# Patient Record
Sex: FEMALE | Race: WHITE | NOT HISPANIC OR LATINO | Employment: OTHER | URBAN - NONMETROPOLITAN AREA
[De-identification: names, ages, dates, MRNs, and addresses within clinical notes are randomized per-mention and may not be internally consistent; named-entity substitution may affect disease eponyms.]

---

## 2017-06-10 ENCOUNTER — OFFICE VISIT (OUTPATIENT)
Dept: RETAIL CLINIC | Facility: CLINIC | Age: 62
End: 2017-06-10

## 2017-06-10 VITALS
SYSTOLIC BLOOD PRESSURE: 149 MMHG | HEART RATE: 84 BPM | OXYGEN SATURATION: 98 % | DIASTOLIC BLOOD PRESSURE: 86 MMHG | TEMPERATURE: 98.7 F

## 2017-06-10 DIAGNOSIS — Z20.818 EXPOSURE TO STREP THROAT: ICD-10-CM

## 2017-06-10 DIAGNOSIS — J40 BRONCHITIS: Primary | ICD-10-CM

## 2017-06-10 LAB
EXPIRATION DATE: NORMAL
INTERNAL CONTROL: NORMAL
Lab: NORMAL
S PYO AG THROAT QL: NEGATIVE

## 2017-06-10 PROCEDURE — 99213 OFFICE O/P EST LOW 20 MIN: CPT | Performed by: NURSE PRACTITIONER

## 2017-06-10 PROCEDURE — 87880 STREP A ASSAY W/OPTIC: CPT | Performed by: NURSE PRACTITIONER

## 2017-06-10 RX ORDER — ESOMEPRAZOLE MAGNESIUM 40 MG/1
CAPSULE, DELAYED RELEASE ORAL
COMMUNITY
Start: 2013-11-22 | End: 2020-06-16 | Stop reason: SDUPTHER

## 2017-06-10 RX ORDER — GUAIFENESIN 600 MG/1
1200 TABLET, EXTENDED RELEASE ORAL 2 TIMES DAILY
Qty: 40 TABLET | Refills: 0 | Status: SHIPPED | OUTPATIENT
Start: 2017-06-10 | End: 2017-06-20

## 2017-06-10 RX ORDER — LORATADINE 10 MG/1
TABLET ORAL
COMMUNITY

## 2017-06-10 RX ORDER — AZITHROMYCIN 250 MG/1
TABLET, FILM COATED ORAL
Qty: 6 TABLET | Refills: 0 | Status: SHIPPED | OUTPATIENT
Start: 2017-06-10 | End: 2018-12-29

## 2017-06-10 RX ORDER — FLUTICASONE PROPIONATE 50 MCG
SPRAY, SUSPENSION (ML) NASAL
COMMUNITY
End: 2020-05-19 | Stop reason: SDUPTHER

## 2017-06-10 NOTE — PATIENT INSTRUCTIONS
Increase fluid intake  Warm salt water gargles as needed for sore throat  Do not over suppress cough  If no improvement over next 2-3 days or symptoms worsen, follow up with PCP      Acute Bronchitis  Bronchitis is inflammation of the airways that extend from the windpipe into the lungs (bronchi). The inflammation often causes mucus to develop. This leads to a cough, which is the most common symptom of bronchitis.   In acute bronchitis, the condition usually develops suddenly and goes away over time, usually in a couple weeks. Smoking, allergies, and asthma can make bronchitis worse. Repeated episodes of bronchitis may cause further lung problems.   CAUSES  Acute bronchitis is most often caused by the same virus that causes a cold. The virus can spread from person to person (contagious) through coughing, sneezing, and touching contaminated objects.  SIGNS AND SYMPTOMS   · Cough.    · Fever.    · Coughing up mucus.    · Body aches.    · Chest congestion.    · Chills.    · Shortness of breath.    · Sore throat.    DIAGNOSIS   Acute bronchitis is usually diagnosed through a physical exam. Your health care provider will also ask you questions about your medical history. Tests, such as chest X-rays, are sometimes done to rule out other conditions.   TREATMENT   Acute bronchitis usually goes away in a couple weeks. Oftentimes, no medical treatment is necessary. Medicines are sometimes given for relief of fever or cough. Antibiotic medicines are usually not needed but may be prescribed in certain situations. In some cases, an inhaler may be recommended to help reduce shortness of breath and control the cough. A cool mist vaporizer may also be used to help thin bronchial secretions and make it easier to clear the chest.   HOME CARE INSTRUCTIONS  · Get plenty of rest.    · Drink enough fluids to keep your urine clear or pale yellow (unless you have a medical condition that requires fluid restriction). Increasing fluids may  help thin your respiratory secretions (sputum) and reduce chest congestion, and it will prevent dehydration.    · Take medicines only as directed by your health care provider.  · If you were prescribed an antibiotic medicine, finish it all even if you start to feel better.  · Avoid smoking and secondhand smoke. Exposure to cigarette smoke or irritating chemicals will make bronchitis worse. If you are a smoker, consider using nicotine gum or skin patches to help control withdrawal symptoms. Quitting smoking will help your lungs heal faster.    · Reduce the chances of another bout of acute bronchitis by washing your hands frequently, avoiding people with cold symptoms, and trying not to touch your hands to your mouth, nose, or eyes.    · Keep all follow-up visits as directed by your health care provider.    SEEK MEDICAL CARE IF:  Your symptoms do not improve after 1 week of treatment.   SEEK IMMEDIATE MEDICAL CARE IF:  · You develop an increased fever or chills.    · You have chest pain.    · You have severe shortness of breath.  · You have bloody sputum.    · You develop dehydration.  · You faint or repeatedly feel like you are going to pass out.  · You develop repeated vomiting.  · You develop a severe headache.  MAKE SURE YOU:   · Understand these instructions.  · Will watch your condition.  · Will get help right away if you are not doing well or get worse.     This information is not intended to replace advice given to you by your health care provider. Make sure you discuss any questions you have with your health care provider.     Document Released: 01/25/2006 Document Revised: 01/08/2016 Document Reviewed: 06/10/2014  [employer] Patient Education ©2017 [employer]

## 2017-06-10 NOTE — PROGRESS NOTES
Serjio Diaz is a 62 y.o. female.     Cough   This is a new problem. The current episode started in the past 7 days (Worsened overnight). The problem has been gradually worsening. The cough is non-productive. Associated symptoms include chest pain (With cough), nasal congestion, a sore throat (Exposed to strep) and sweats (Hot flashes at night; normal for patient). Pertinent negatives include no fever. The symptoms are aggravated by lying down. Treatments tried: Cold compresses; Flonase  The treatment provided moderate (Flonase clears out congestion; uses daily) relief. Her past medical history is significant for environmental allergies. There is no history of asthma, COPD or pneumonia.        The following portions of the patient's history were reviewed and updated as appropriate: allergies, current medications, past family history, past medical history, past social history, past surgical history and problem list.    Review of Systems   Constitutional: Negative for fever.   HENT: Positive for congestion, sinus pressure and sore throat (Exposed to strep).    Eyes: Negative for discharge.   Respiratory: Positive for cough.    Cardiovascular: Positive for chest pain (With cough).   Gastrointestinal: Negative for diarrhea, nausea and vomiting.   Allergic/Immunologic: Positive for environmental allergies.       Objective   Physical Exam   Constitutional: She appears well-developed. No distress.   HENT:   Right Ear: External ear normal. Tympanic membrane is not erythematous and not bulging.   Left Ear: External ear normal. Tympanic membrane is erythematous. Tympanic membrane is not bulging.   Nose: Right sinus exhibits no maxillary sinus tenderness and no frontal sinus tenderness. Left sinus exhibits no maxillary sinus tenderness and no frontal sinus tenderness.   Mouth/Throat: Posterior oropharyngeal erythema (No tonsillar enlargement visible) present. No oropharyngeal exudate.   Neck: Neck supple.    Cardiovascular: Normal rate, regular rhythm and normal heart sounds.  Exam reveals no gallop and no friction rub.    No murmur heard.  Pulmonary/Chest: Effort normal and breath sounds normal. No respiratory distress. She has no wheezes. She has no rales.   Lymphadenopathy:     She has cervical adenopathy (Tonsillar nodes).   Neurological: She is alert.   Skin: Skin is warm. She is not diaphoretic.   Psychiatric: She has a normal mood and affect. Her behavior is normal.       Assessment/Plan   Julia was seen today for cough.    Diagnoses and all orders for this visit:    Bronchitis    Exposure to strep throat    Advised to refrain from decongestants based on elevated BP today.  Patient will monitor at home.  States she gets nervous in office.  Tight, coarse cough noted throughout visit.        Increase fluid intake  Warm salt water gargles as needed for sore throat  Do not over suppress cough  If no improvement over next 2-3 days or symptoms worsen, follow up with PCP

## 2017-11-30 RX ORDER — SIMVASTATIN 20 MG
20 TABLET ORAL NIGHTLY
COMMUNITY

## 2017-11-30 RX ORDER — NAPROXEN SODIUM 220 MG
220 TABLET ORAL 2 TIMES DAILY WITH MEALS
COMMUNITY

## 2017-12-26 ENCOUNTER — TELEPHONE (OUTPATIENT)
Dept: GASTROENTEROLOGY | Age: 62
End: 2017-12-26

## 2018-01-24 ENCOUNTER — ANESTHESIA (OUTPATIENT)
Dept: OPERATING ROOM | Age: 63
End: 2018-01-24

## 2018-01-24 ENCOUNTER — HOSPITAL ENCOUNTER (OUTPATIENT)
Age: 63
Setting detail: SPECIMEN
Discharge: HOME | End: 2018-01-24
Payer: COMMERCIAL

## 2018-01-24 ENCOUNTER — ANESTHESIA EVENT (OUTPATIENT)
Dept: OPERATING ROOM | Age: 63
End: 2018-01-24

## 2018-01-24 ENCOUNTER — HOSPITAL ENCOUNTER (OUTPATIENT)
Age: 63
Setting detail: OUTPATIENT SURGERY
Discharge: HOME | End: 2018-01-24
Attending: INTERNAL MEDICINE | Admitting: INTERNAL MEDICINE
Payer: COMMERCIAL

## 2018-01-24 VITALS — DIASTOLIC BLOOD PRESSURE: 59 MMHG | SYSTOLIC BLOOD PRESSURE: 112 MMHG | OXYGEN SATURATION: 100 %

## 2018-01-24 VITALS
WEIGHT: 180 LBS | OXYGEN SATURATION: 93 % | DIASTOLIC BLOOD PRESSURE: 51 MMHG | HEART RATE: 82 BPM | SYSTOLIC BLOOD PRESSURE: 100 MMHG | BODY MASS INDEX: 31.89 KG/M2 | TEMPERATURE: 98.7 F | HEIGHT: 63 IN | RESPIRATION RATE: 18 BRPM

## 2018-01-24 PROCEDURE — 45380 COLONOSCOPY AND BIOPSY: CPT | Performed by: INTERNAL MEDICINE

## 2018-01-24 PROCEDURE — G8918 PT W/O PREOP ORDER IV AB PRO: HCPCS

## 2018-01-24 PROCEDURE — 45385 COLONOSCOPY W/LESION REMOVAL: CPT | Performed by: INTERNAL MEDICINE

## 2018-01-24 PROCEDURE — 88305 TISSUE EXAM BY PATHOLOGIST: CPT

## 2018-01-24 PROCEDURE — 45380 COLONOSCOPY AND BIOPSY: CPT

## 2018-01-24 PROCEDURE — 45385 COLONOSCOPY W/LESION REMOVAL: CPT

## 2018-01-24 PROCEDURE — G8907 PT DOC NO EVENTS ON DISCHARG: HCPCS

## 2018-01-24 RX ORDER — PROPOFOL 10 MG/ML
INJECTION, EMULSION INTRAVENOUS PRN
Status: DISCONTINUED | OUTPATIENT
Start: 2018-01-24 | End: 2018-01-24 | Stop reason: SDUPTHER

## 2018-01-24 RX ORDER — SODIUM CHLORIDE, SODIUM LACTATE, POTASSIUM CHLORIDE, CALCIUM CHLORIDE 600; 310; 30; 20 MG/100ML; MG/100ML; MG/100ML; MG/100ML
INJECTION, SOLUTION INTRAVENOUS CONTINUOUS
Status: DISCONTINUED | OUTPATIENT
Start: 2018-01-24 | End: 2018-01-24 | Stop reason: HOSPADM

## 2018-01-24 RX ADMIN — SODIUM CHLORIDE, SODIUM LACTATE, POTASSIUM CHLORIDE, CALCIUM CHLORIDE: 600; 310; 30; 20 INJECTION, SOLUTION INTRAVENOUS at 10:16

## 2018-01-24 RX ADMIN — PROPOFOL 350 MG: 10 INJECTION, EMULSION INTRAVENOUS at 10:41

## 2018-01-24 NOTE — OP NOTE
Post Procedure Note    Name of surgeon / : Srinivas Gupta DO    Date of Service: 01/24/18    Withdrawal Time: >6min    Prep Quality: excellent     Pre-operative Diagnosis:   Active Hospital Problems    Diagnosis Date Noted    Colon cancer screening [Z12.11] 10/01/2013       Post-operative Diagnosis/Findings: colon polyps    Procedure: Procedure(s):  COLONOSCOPY WITH cold biopsy polypectomy and cold snare polypectomy    Anesthesia: Monitor Anesthesia Care    Surgeons/Assistants: Srinivas Gupta DO    Referring Physician: Kim Daley, *    Procedure Note:  After informed consent was obtained, the patient was placed in the left lateral decubitus position and sedated per MAC. A rectal exam was done which was normal.  The colonoscope was inserted into the rectum and retroflexed which was normal.  The colonoscope was then advanced to the cecum under direct visualization. The appendiceal orifice and ileocecal valve were identified. The colonoscope was withdrawn. A polyp was seen in the ascending colon. It was small in size. It was removed via removed by cold snare and retrieved for pathology. 2 polyps were seen in the transverse colon. One was removed via cold snare and the other via cold biopsy polypectomy. 3 dimunitive polyps were seen in the rectum. They were removed via cold biopsy polypectomy. No other abnormalities were discovered. The colonoscope was completely withdrawn. The patient tolerated the procedure.      Estimated Blood Loss: Minimal    Complications: None    Specimens:   ID Type Source Tests Collected by Time Destination   A : ascending colon polyp Tissue Colon SURGICAL PATHOLOGY Joslyn Webster,  1/24/2018 1054    B : transverse colon polyp Tissue Colon SURGICAL PATHOLOGY Joslyn Webster,  1/24/2018 1057    C : transverse colon polyp cold snare Tissue Colon SURGICAL PATHOLOGY Joslyn Webster,  1/24/2018 1059    D : rectal sig polyp  Tissue Recto sigmoid SURGICAL PATHOLOGY Timoteo Webster, DO 1/24/2018 1102        Discussion: The patient had Colon Polyps. I will f/u on Pathology and likely recommend a repeat colonoscopy in 3 years.     [address] [facility], DO  01/24/18  11:07 AM

## 2018-01-24 NOTE — ANESTHESIA PRE PROCEDURE
Department of Anesthesiology  Preprocedure Note       Name:  Lilia Moore   Age:  57 y.o.  :  1955                                          MRN:  [id number]         Date:  2018      Surgeon: Millicent Nolen):  Chelsie Webster DO    Procedure: Procedure(s):  COLONOSCOPY DIAGNOSTIC OR SCREENING    Medications prior to admission:   Prior to Admission medications    Medication Sig Start Date End Date Taking? Authorizing Provider   simvastatin (ZOCOR) 20 MG tablet Take 20 mg by mouth nightly   Yes Historical Provider, MD   naproxen sodium (ALEVE) 220 MG tablet Take 220 mg by mouth 2 times daily (with meals)   Yes Historical Provider, MD   fluticasone (FLONASE) 50 MCG/ACT nasal spray 1 spray by Nasal route daily   Yes Historical Provider, MD   esomeprazole (NEXIUM) 40 MG capsule Take 1 capsule by mouth daily. 13  Yes Guy Webster DO   loratadine (CLARITIN) 10 MG tablet Take 10 mg by mouth daily. Yes Historical Provider, MD   aspirin 325 MG EC tablet Take 1 tablet by mouth 2 times daily 7/10/16   Zo Huertas MD       Current medications:    No current facility-administered medications for this encounter.         Allergies:  No Known Allergies    Problem List:    Patient Active Problem List   Diagnosis Code    Colon cancer screening Z12.11    Reflux K21.9       Past Medical History:        Diagnosis Date    Allergic rhinitis     Anxiety     Arthritis     Backache, unspecified     GERD (gastroesophageal reflux disease)     History of blood transfusion     Pure hypercholesterolemia     Sciatic pain     into rt leg    Skin lesion     Unspecified arthropathy, multiple sites     Vitamin D deficiency        Past Surgical History:        Procedure Laterality Date    CARPAL TUNNEL RELEASE Left      SECTION      x 2    EXTERNAL EAR SURGERY      HYSTERECTOMY      still has bilateral ovaries    PILONIDAL CYST EXCISION      TOTAL KNEE ARTHROPLASTY Left 2016    KNEE TOTAL

## 2018-12-29 ENCOUNTER — OFFICE VISIT (OUTPATIENT)
Dept: RETAIL CLINIC | Facility: CLINIC | Age: 63
End: 2018-12-29

## 2018-12-29 VITALS
HEART RATE: 90 BPM | DIASTOLIC BLOOD PRESSURE: 86 MMHG | TEMPERATURE: 97.2 F | SYSTOLIC BLOOD PRESSURE: 142 MMHG | OXYGEN SATURATION: 97 % | RESPIRATION RATE: 20 BRPM

## 2018-12-29 DIAGNOSIS — J20.9 ACUTE BRONCHITIS, UNSPECIFIED ORGANISM: Primary | ICD-10-CM

## 2018-12-29 PROCEDURE — 99213 OFFICE O/P EST LOW 20 MIN: CPT | Performed by: NURSE PRACTITIONER

## 2018-12-29 PROCEDURE — 94640 AIRWAY INHALATION TREATMENT: CPT | Performed by: NURSE PRACTITIONER

## 2018-12-29 RX ORDER — ALBUTEROL SULFATE 2.5 MG/3ML
2.5 SOLUTION RESPIRATORY (INHALATION) EVERY 6 HOURS PRN
Status: DISCONTINUED | OUTPATIENT
Start: 2018-12-29 | End: 2018-12-29

## 2018-12-29 RX ORDER — ALBUTEROL SULFATE 2.5 MG/3ML
2.5 SOLUTION RESPIRATORY (INHALATION) ONCE
Status: COMPLETED | OUTPATIENT
Start: 2018-12-29 | End: 2018-12-29

## 2018-12-29 RX ORDER — METHYLPREDNISOLONE 4 MG/1
TABLET ORAL
Qty: 21 TABLET | Refills: 0 | Status: SHIPPED | OUTPATIENT
Start: 2018-12-29 | End: 2020-04-15

## 2018-12-29 RX ORDER — AMOXICILLIN AND CLAVULANATE POTASSIUM 875; 125 MG/1; MG/1
1 TABLET, FILM COATED ORAL 2 TIMES DAILY
Qty: 20 TABLET | Refills: 0 | Status: SHIPPED | OUTPATIENT
Start: 2018-12-29 | End: 2019-01-08

## 2018-12-29 RX ORDER — ATORVASTATIN CALCIUM 10 MG/1
10 TABLET, FILM COATED ORAL DAILY
Refills: 3 | COMMUNITY
Start: 2018-12-13 | End: 2020-07-16 | Stop reason: SINTOL

## 2018-12-29 RX ORDER — ALBUTEROL SULFATE 90 UG/1
2 AEROSOL, METERED RESPIRATORY (INHALATION) EVERY 4 HOURS PRN
Qty: 1 INHALER | Refills: 0 | Status: SHIPPED | OUTPATIENT
Start: 2018-12-29 | End: 2021-07-20

## 2018-12-29 RX ADMIN — ALBUTEROL SULFATE 2.5 MG: 2.5 SOLUTION RESPIRATORY (INHALATION) at 15:21

## 2018-12-29 NOTE — PROGRESS NOTES
Chief Complaint   Patient presents with   • Cough     Subjective   Julia Diaz is a 63 y.o. female who presents to the clinic today with complaints cough. She has had cold symptoms for about 2 weeks and now she is coughing and sinus pressure has gotten worse. She says HA and coughing have worn her out. She is caring for her  who had a stroke.   Cough   This is a new problem. The current episode started 1 to 4 weeks ago. The problem has been gradually worsening. The cough is productive of purulent sputum. Associated symptoms include ear congestion, a fever, headaches, postnasal drip, shortness of breath and wheezing. Pertinent negatives include no chest pain, chills, ear pain, heartburn, hemoptysis, myalgias, nasal congestion, rash, rhinorrhea, sore throat, sweats or weight loss. Nothing aggravates the symptoms. Risk factors for lung disease include smoking/tobacco exposure. She has tried nothing for the symptoms. Her past medical history is significant for environmental allergies. There is no history of asthma, bronchiectasis, bronchitis, COPD, emphysema or pneumonia.         Current Outpatient Medications:   •  esomeprazole (nexIUM) 40 MG capsule, Take  by mouth., Disp: , Rfl:   •  fluticasone (FLONASE) 50 MCG/ACT nasal spray, into each nostril., Disp: , Rfl:   •  loratadine (CLARITIN) 10 MG tablet, Take  by mouth., Disp: , Rfl:   •  Naproxen Sodium (ALEVE PO), Take  by mouth., Disp: , Rfl:   •  albuterol sulfate  (90 Base) MCG/ACT inhaler, Inhale 2 puffs Every 4 (Four) Hours As Needed for Wheezing or Shortness of Air., Disp: 1 inhaler, Rfl: 0  •  amoxicillin-clavulanate (AUGMENTIN) 875-125 MG per tablet, Take 1 tablet by mouth 2 (Two) Times a Day for 10 days., Disp: 20 tablet, Rfl: 0  •  atorvastatin (LIPITOR) 10 MG tablet, Take 10 mg by mouth Daily., Disp: , Rfl: 3  •  MethylPREDNISolone (MEDROL, ALICIA,) 4 MG tablet, Take as directed on package instructions., Disp: 21 tablet, Rfl: 0  No current  facility-administered medications for this visit.     Allergies:  Patient has no known allergies.    Past Medical History:   Diagnosis Date   • Acid reflux    • Allergic    • Arthritis    • Elevated cholesterol    • Sinusitis      Past Surgical History:   Procedure Laterality Date   • CARPAL TUNNEL RELEASE     •  SECTION     • HYSTERECTOMY     • INNER EAR SURGERY     • REPLACEMENT TOTAL KNEE       Family History   Problem Relation Age of Onset   • Obesity Mother    • Cancer Father    • Heart disease Father    • Obesity Father      Social History     Tobacco Use   • Smoking status: Current Every Day Smoker   • Smokeless tobacco: Never Used   Substance Use Topics   • Alcohol use: No   • Drug use: No       Review of Systems  Review of Systems   Constitutional: Positive for fatigue and fever. Negative for chills and weight loss.   HENT: Positive for postnasal drip, sinus pressure and sinus pain. Negative for congestion, ear pain, rhinorrhea, sneezing and sore throat.    Respiratory: Positive for cough, shortness of breath and wheezing. Negative for hemoptysis.    Cardiovascular: Negative for chest pain.   Gastrointestinal: Negative for heartburn.   Musculoskeletal: Negative for myalgias.   Skin: Negative for rash.   Allergic/Immunologic: Positive for environmental allergies.   Neurological: Positive for headaches.   Hematological: Negative for adenopathy.       Objective   /86 (BP Location: Left arm, Patient Position: Sitting, Cuff Size: Adult)   Pulse 90   Temp 97.2 °F (36.2 °C) (Tympanic)   Resp 20   SpO2 97%       Physical Exam   Constitutional: She is oriented to person, place, and time. She appears well-developed and well-nourished.   HENT:   Head: Normocephalic and atraumatic.   Right Ear: Hearing, external ear and ear canal normal. Tympanic membrane is erythematous. Tympanic membrane is not bulging. No middle ear effusion.   Left Ear: Hearing, external ear and ear canal normal. Tympanic membrane  is erythematous. Tympanic membrane is not bulging.  No middle ear effusion.   Nose: Mucosal edema present. Right sinus exhibits maxillary sinus tenderness and frontal sinus tenderness. Left sinus exhibits maxillary sinus tenderness and frontal sinus tenderness.   Mouth/Throat: Uvula is midline and mucous membranes are normal. Oropharyngeal exudate and posterior oropharyngeal erythema present. No posterior oropharyngeal edema or tonsillar abscesses. Tonsils are 1+ on the right. Tonsils are 1+ on the left. No tonsillar exudate.   Eyes: EOM are normal. Pupils are equal, round, and reactive to light.   Neck: Normal range of motion. Neck supple.   Cardiovascular: Normal rate, regular rhythm and normal heart sounds. Exam reveals no gallop and no friction rub.   No murmur heard.  Pulmonary/Chest: Effort normal. No stridor. No respiratory distress. She has wheezes. She has no rales. She exhibits no tenderness.   Diminished breath sounds. Air movement and wheezing improved with albuterol nebulizer.    Lymphadenopathy:     She has no cervical adenopathy.   Neurological: She is alert and oriented to person, place, and time.   Psychiatric: She has a normal mood and affect.   Vitals reviewed.      Assessment/Plan     Julia was seen today for cough.    Diagnoses and all orders for this visit:    Acute bronchitis, unspecified organism  -     albuterol (PROVENTIL) nebulizer solution 0.083% 2.5 mg/3mL; Take 2.5 mg by nebulization 1 (One) Time.    Other orders  -     amoxicillin-clavulanate (AUGMENTIN) 875-125 MG per tablet; Take 1 tablet by mouth 2 (Two) Times a Day for 10 days.  -     MethylPREDNISolone (MEDROL, ALICIA,) 4 MG tablet; Take as directed on package instructions.  -     albuterol sulfate  (90 Base) MCG/ACT inhaler; Inhale 2 puffs Every 4 (Four) Hours As Needed for Wheezing or Shortness of Air.        Drink plenty of fluids and rest  Saline nasal spray  Follow up if symptoms worsen or persist may need chest  x-ray  Go to emergency room if fever, shortness of breath or wheezing worsens.

## 2018-12-29 NOTE — PATIENT INSTRUCTIONS
Drink plenty of fluids and rest  Saline nasal spray  Follow up if symptoms worsen or persist may need chest x-ray  Go to emergency room if fever, shortness of breath or wheezing worsens.   Sinusitis, Adult  Sinusitis is soreness and inflammation of your sinuses. Sinuses are hollow spaces in the bones around your face. Your sinuses are located:  · Around your eyes.  · In the middle of your forehead.  · Behind your nose.  · In your cheekbones.    Your sinuses and nasal passages are lined with a stringy fluid (mucus). Mucus normally drains out of your sinuses. When your nasal tissues become inflamed or swollen, the mucus can become trapped or blocked so air cannot flow through your sinuses. This allows bacteria, viruses, and funguses to grow, which leads to infection.  Sinusitis can develop quickly and last for 7?10 days (acute) or for more than 12 weeks (chronic). Sinusitis often develops after a cold.  What are the causes?  This condition is caused by anything that creates swelling in the sinuses or stops mucus from draining, including:  · Allergies.  · Asthma.  · Bacterial or viral infection.  · Abnormally shaped bones between the nasal passages.  · Nasal growths that contain mucus (nasal polyps).  · Narrow sinus openings.  · Pollutants, such as chemicals or irritants in the air.  · A foreign object stuck in the nose.  · A fungal infection. This is rare.    What increases the risk?  The following factors may make you more likely to develop this condition:  · Having allergies or asthma.  · Having had a recent cold or respiratory tract infection.  · Having structural deformities or blockages in your nose or sinuses.  · Having a weak immune system.  · Doing a lot of swimming or diving.  · Overusing nasal sprays.  · Smoking.    What are the signs or symptoms?  The main symptoms of this condition are pain and a feeling of pressure around the affected sinuses. Other symptoms include:  · Upper  toothache.  · Earache.  · Headache.  · Bad breath.  · Decreased sense of smell and taste.  · A cough that may get worse at night.  · Fatigue.  · Fever.  · Thick drainage from your nose. The drainage is often green and it may contain pus (purulent).  · Stuffy nose or congestion.  · Postnasal drip. This is when extra mucus collects in the throat or back of the nose.  · Swelling and warmth over the affected sinuses.  · Sore throat.  · Sensitivity to light.    How is this diagnosed?  This condition is diagnosed based on symptoms, a medical history, and a physical exam. To find out if your condition is acute or chronic, your health care provider may:  · Look in your nose for signs of nasal polyps.  · Tap over the affected sinus to check for signs of infection.  · View the inside of your sinuses using an imaging device that has a light attached (endoscope).    If your health care provider suspects that you have chronic sinusitis, you may also:  · Be tested for allergies.  · Have a sample of mucus taken from your nose (nasal culture) and checked for bacteria.  · Have a mucus sample examined to see if your sinusitis is related to an allergy.    If your sinusitis does not respond to treatment and it lasts longer than 8 weeks, you may have an MRI or CT scan to check your sinuses. These scans also help to determine how severe your infection is.  In rare cases, a bone biopsy may be done to rule out more serious types of fungal sinus disease.  How is this treated?  Treatment for sinusitis depends on the cause and whether your condition is chronic or acute. If a virus is causing your sinusitis, your symptoms will go away on their own within 10 days. You may be given medicines to relieve your symptoms, including:  · Topical nasal decongestants. They shrink swollen nasal passages and let mucus drain from your sinuses.  · Antihistamines. These drugs block inflammation that is triggered by allergies. This can help to ease swelling in  your nose and sinuses.  · Topical nasal corticosteroids. These are nasal sprays that ease inflammation and swelling in your nose and sinuses.  · Nasal saline washes. These rinses can help to get rid of thick mucus in your nose.    If your condition is caused by bacteria, you will be given an antibiotic medicine. If your condition is caused by a fungus, you will be given an antifungal medicine.  Surgery may be needed to correct underlying conditions, such as narrow nasal passages. Surgery may also be needed to remove polyps.  Follow these instructions at home:  Medicines  · Take, use, or apply over-the-counter and prescription medicines only as told by your health care provider. These may include nasal sprays.  · If you were prescribed an antibiotic medicine, take it as told by your health care provider. Do not stop taking the antibiotic even if you start to feel better.  Hydrate and Humidify  · Drink enough water to keep your urine clear or pale yellow. Staying hydrated will help to thin your mucus.  · Use a cool mist humidifier to keep the humidity level in your home above 50%.  · Inhale steam for 10-15 minutes, 3-4 times a day or as told by your health care provider. You can do this in the bathroom while a hot shower is running.  · Limit your exposure to cool or dry air.  Rest  · Rest as much as possible.  · Sleep with your head raised (elevated).  · Make sure to get enough sleep each night.  General instructions  · Apply a warm, moist washcloth to your face 3-4 times a day or as told by your health care provider. This will help with discomfort.  · Wash your hands often with soap and water to reduce your exposure to viruses and other germs. If soap and water are not available, use hand .  · Do not smoke. Avoid being around people who are smoking (secondhand smoke).  · Keep all follow-up visits as told by your health care provider. This is important.  Contact a health care provider if:  · You have a  fever.  · Your symptoms get worse.  · Your symptoms do not improve within 10 days.  Get help right away if:  · You have a severe headache.  · You have persistent vomiting.  · You have pain or swelling around your face or eyes.  · You have vision problems.  · You develop confusion.  · Your neck is stiff.  · You have trouble breathing.  This information is not intended to replace advice given to you by your health care provider. Make sure you discuss any questions you have with your health care provider.  Document Released: 12/18/2006 Document Revised: 08/13/2017 Document Reviewed: 10/12/2016  [employer] Interactive Patient Education © 2018 [employer]  Acute Bronchitis, Adult  Acute bronchitis is when air tubes (bronchi) in the lungs suddenly get swollen. The condition can make it hard to breathe. It can also cause these symptoms:  · A cough.  · Coughing up clear, yellow, or green mucus.  · Wheezing.  · Chest congestion.  · Shortness of breath.  · A fever.  · Body aches.  · Chills.  · A sore throat.    Follow these instructions at home:  Medicines  · Take over-the-counter and prescription medicines only as told by your doctor.  · If you were prescribed an antibiotic medicine, take it as told by your doctor. Do not stop taking the antibiotic even if you start to feel better.  General instructions  · Rest.  · Drink enough fluids to keep your pee (urine) clear or pale yellow.  · Avoid smoking and secondhand smoke. If you smoke and you need help quitting, ask your doctor. Quitting will help your lungs heal faster.  · Use an inhaler, cool mist vaporizer, or humidifier as told by your doctor.  · Keep all follow-up visits as told by your doctor. This is important.  How is this prevented?  To lower your risk of getting this condition again:  · Wash your hands often with soap and water. If you cannot use soap and water, use hand .  · Avoid contact with people who have cold symptoms.  · Try not to touch your hands to  your mouth, nose, or eyes.  · Make sure to get the flu shot every year.    Contact a doctor if:  · Your symptoms do not get better in 2 weeks.  Get help right away if:  · You cough up blood.  · You have chest pain.  · You have very bad shortness of breath.  · You become dehydrated.  · You faint (pass out) or keep feeling like you are going to pass out.  · You keep throwing up (vomiting).  · You have a very bad headache.  · Your fever or chills gets worse.  This information is not intended to replace advice given to you by your health care provider. Make sure you discuss any questions you have with your health care provider.  Document Released: 06/05/2009 Document Revised: 07/26/2017 Document Reviewed: 06/07/2017  [employer] Interactive Patient Education © 2018 [employer]

## 2020-01-27 RX ORDER — LOSARTAN POTASSIUM 50 MG/1
50 TABLET ORAL DAILY
Qty: 90 TABLET | Refills: 1 | Status: SHIPPED | OUTPATIENT
Start: 2020-01-27 | End: 2020-04-23 | Stop reason: RX

## 2020-01-29 ENCOUNTER — TELEPHONE (OUTPATIENT)
Dept: INTERNAL MEDICINE | Facility: CLINIC | Age: 65
End: 2020-01-29

## 2020-01-30 NOTE — TELEPHONE ENCOUNTER
Pt called and asked the status of her medication call in.  I explained she needed to be seen first.  I offered to schedule an appt and pt refused and stated she would do something else instead.    Thanks.

## 2020-03-12 ENCOUNTER — TELEPHONE (OUTPATIENT)
Dept: INTERNAL MEDICINE | Facility: CLINIC | Age: 65
End: 2020-03-12

## 2020-03-12 RX ORDER — EZETIMIBE 10 MG/1
10 TABLET ORAL DAILY
Qty: 90 TABLET | Refills: 3 | Status: SHIPPED | OUTPATIENT
Start: 2020-03-12 | End: 2020-03-13 | Stop reason: SDUPTHER

## 2020-03-13 RX ORDER — EZETIMIBE 10 MG/1
10 TABLET ORAL DAILY
Qty: 90 TABLET | Refills: 3 | Status: SHIPPED | OUTPATIENT
Start: 2020-03-13 | End: 2020-03-23 | Stop reason: SDUPTHER

## 2020-03-23 RX ORDER — EZETIMIBE 10 MG/1
10 TABLET ORAL DAILY
Qty: 90 TABLET | Refills: 3 | Status: SHIPPED | OUTPATIENT
Start: 2020-03-23 | End: 2021-01-20

## 2020-04-15 ENCOUNTER — OFFICE VISIT (OUTPATIENT)
Dept: INTERNAL MEDICINE | Facility: CLINIC | Age: 65
End: 2020-04-15

## 2020-04-15 VITALS
HEIGHT: 63 IN | WEIGHT: 195 LBS | SYSTOLIC BLOOD PRESSURE: 123 MMHG | BODY MASS INDEX: 34.55 KG/M2 | DIASTOLIC BLOOD PRESSURE: 72 MMHG | HEART RATE: 86 BPM

## 2020-04-15 DIAGNOSIS — K21.9 GASTROESOPHAGEAL REFLUX DISEASE WITHOUT ESOPHAGITIS: ICD-10-CM

## 2020-04-15 DIAGNOSIS — Z72.0 TOBACCO USE: ICD-10-CM

## 2020-04-15 DIAGNOSIS — E78.00 HYPERCHOLESTEREMIA: ICD-10-CM

## 2020-04-15 DIAGNOSIS — I10 HYPERTENSION, BENIGN: Primary | ICD-10-CM

## 2020-04-15 PROBLEM — K61.1 ABSCESS, PERIRECTAL: Status: ACTIVE | Noted: 2020-04-15

## 2020-04-15 PROBLEM — M17.10 ARTHRITIS OF KNEE: Status: ACTIVE | Noted: 2020-04-15

## 2020-04-15 PROBLEM — F41.9 ANXIETY: Status: ACTIVE | Noted: 2020-04-15

## 2020-04-15 PROBLEM — G47.00 INSOMNIA, CONTROLLED: Status: ACTIVE | Noted: 2020-04-15

## 2020-04-15 PROBLEM — E55.9 VITAMIN D DEFICIENCY: Status: ACTIVE | Noted: 2020-04-15

## 2020-04-15 PROBLEM — R73.01 IMPAIRED FASTING GLUCOSE: Status: ACTIVE | Noted: 2020-04-15

## 2020-04-15 PROBLEM — M12.9 ARTHROPATHY, MULTIPLE SITES: Status: ACTIVE | Noted: 2020-04-15

## 2020-04-15 PROCEDURE — 99442 PR PHYS/QHP TELEPHONE EVALUATION 11-20 MIN: CPT | Performed by: INTERNAL MEDICINE

## 2020-04-15 NOTE — PROGRESS NOTES
Subjective   Julia Diaz is a 65 y.o. female.   Chief Complaint   Patient presents with   • Follow-up     3 month       You have chosen to receive care through a telephone visit. Do you consent to use a telephone visit for your medical care today? Yes    Is a follow-up visit for patient who has hypertension, her readings at home are good.  She also has impaired fasting glucose but has not required home glucose monitoring.  She also has GERD and is well controlled with medication.  She has hyper cholesterolemia and takes her medication as prescribed.  Unfortunately she continues to smoke we discussed smoking cessation.  I do not believe she is ready to quit smoking no prescription medications were offered until she makes that decision.       The following portions of the patient's history were reviewed and updated as appropriate: allergies, current medications, past family history, past medical history, past social history, past surgical history and problem list.    Review of Systems   Constitutional: Negative for activity change, appetite change, fatigue, fever, unexpected weight gain and unexpected weight loss.   HENT: Negative for swollen glands, trouble swallowing and voice change.    Eyes: Negative for blurred vision and visual disturbance.   Respiratory: Negative for cough and shortness of breath.    Cardiovascular: Negative for chest pain, palpitations and leg swelling.   Gastrointestinal: Negative for abdominal pain, constipation, diarrhea, nausea, vomiting and indigestion.   Endocrine: Negative for cold intolerance, heat intolerance, polydipsia and polyphagia.   Genitourinary: Negative for dysuria and frequency.   Musculoskeletal: Negative for arthralgias, back pain, joint swelling and neck pain.   Skin: Negative for color change, rash and skin lesions.   Neurological: Negative for dizziness, weakness, headache, memory problem and confusion.   Hematological: Does not bruise/bleed easily.    Psychiatric/Behavioral: Negative for agitation, hallucinations and suicidal ideas. The patient is not nervous/anxious.        Objective   Past Medical History:   Diagnosis Date   • Acid reflux    • Allergic    • Arthritis    • Elevated cholesterol    • Sinusitis       Past Surgical History:   Procedure Laterality Date   • CARPAL TUNNEL RELEASE     •  SECTION     • HYSTERECTOMY     • INNER EAR SURGERY     • REPLACEMENT TOTAL KNEE          Current Outpatient Medications:   •  atorvastatin (LIPITOR) 10 MG tablet, Take 10 mg by mouth Daily., Disp: , Rfl: 3  •  esomeprazole (nexIUM) 40 MG capsule, Take  by mouth., Disp: , Rfl:   •  ezetimibe (Zetia) 10 MG tablet, Take 1 tablet by mouth Daily., Disp: 90 tablet, Rfl: 3  •  fluticasone (FLONASE) 50 MCG/ACT nasal spray, into each nostril., Disp: , Rfl:   •  loratadine (CLARITIN) 10 MG tablet, Take  by mouth., Disp: , Rfl:   •  losartan (COZAAR) 50 MG tablet, Take 1 tablet by mouth Daily., Disp: 90 tablet, Rfl: 1  •  Naproxen Sodium (ALEVE PO), Take  by mouth., Disp: , Rfl:   •  albuterol sulfate  (90 Base) MCG/ACT inhaler, Inhale 2 puffs Every 4 (Four) Hours As Needed for Wheezing or Shortness of Air., Disp: 1 inhaler, Rfl: 0     Vitals:    04/15/20 0952   BP: 123/72   Pulse: 86         04/15/20  0952   Weight: 88.5 kg (195 lb)     Patient's Body mass index is 35.1 kg/m². BMI is above normal parameters. Recommendations include: exercise counseling and nutrition counseling.      Physical Exam   Constitutional: She is oriented to person, place, and time.   Neurological: She is alert and oriented to person, place, and time.   Psychiatric: She has a normal mood and affect. Her behavior is normal. Judgment and thought content normal.   This visit has been rescheduled as a phone visit to comply with patient safety concerns in accordance with CDC recommendations. Total time of discussion was 12 minutes.            Assessment/Plan   Diagnoses and all orders for this  visit:    1. Hypertension, benign (Primary)    2. Hypercholesteremia    3. Gastroesophageal reflux disease without esophagitis    4. Tobacco use      Patient's blood pressure is well controlled.  Patient is taking medication for cholesterol as prescribed.  GERD is controlled with medication.  Patient is not interested in smoking cessation at this time.  Patient's appointment is moved back 3 months

## 2020-04-23 ENCOUNTER — TELEPHONE (OUTPATIENT)
Dept: INTERNAL MEDICINE | Facility: CLINIC | Age: 65
End: 2020-04-23

## 2020-04-23 RX ORDER — VALSARTAN 80 MG/1
80 TABLET ORAL DAILY
Qty: 90 TABLET | Refills: 3 | Status: SHIPPED | OUTPATIENT
Start: 2020-04-23 | End: 2020-07-16 | Stop reason: CLARIF

## 2020-04-23 NOTE — TELEPHONE ENCOUNTER
As per previously established protocol given by Dr. Damon for substituting ARB's, will change to Valsartan 80mg daily.  Rx sent to pharmacy.

## 2020-04-23 NOTE — TELEPHONE ENCOUNTER
Please find out what they have on hand,  in the ARB category, that we can substitute and then we can call them back.

## 2020-04-23 NOTE — TELEPHONE ENCOUNTER
Spoke with pharmacy, they have Valsartan 80mg, 160mg and Olmesartan 5mg, 10mg, 20mg, 40mg in stock.

## 2020-05-19 RX ORDER — FLUTICASONE PROPIONATE 50 MCG
1 SPRAY, SUSPENSION (ML) NASAL DAILY
Qty: 1 BOTTLE | Refills: 3 | Status: SHIPPED | OUTPATIENT
Start: 2020-05-19 | End: 2020-07-16

## 2020-06-16 RX ORDER — ESOMEPRAZOLE MAGNESIUM 40 MG/1
40 CAPSULE, DELAYED RELEASE ORAL
Qty: 90 CAPSULE | Refills: 3 | Status: SHIPPED | OUTPATIENT
Start: 2020-06-16 | End: 2020-06-23 | Stop reason: SDUPTHER

## 2020-06-23 RX ORDER — ESOMEPRAZOLE MAGNESIUM 40 MG/1
40 CAPSULE, DELAYED RELEASE ORAL
Qty: 90 CAPSULE | Refills: 3 | Status: SHIPPED | OUTPATIENT
Start: 2020-06-23 | End: 2021-05-03

## 2020-07-16 ENCOUNTER — OFFICE VISIT (OUTPATIENT)
Dept: INTERNAL MEDICINE | Facility: CLINIC | Age: 65
End: 2020-07-16

## 2020-07-16 VITALS
TEMPERATURE: 97.9 F | HEIGHT: 63 IN | DIASTOLIC BLOOD PRESSURE: 78 MMHG | BODY MASS INDEX: 35.33 KG/M2 | WEIGHT: 199.4 LBS | HEART RATE: 91 BPM | OXYGEN SATURATION: 97 % | SYSTOLIC BLOOD PRESSURE: 146 MMHG

## 2020-07-16 DIAGNOSIS — I10 HYPERTENSION, BENIGN: ICD-10-CM

## 2020-07-16 DIAGNOSIS — R73.01 IMPAIRED FASTING GLUCOSE: Primary | ICD-10-CM

## 2020-07-16 DIAGNOSIS — E78.00 HYPERCHOLESTEREMIA: ICD-10-CM

## 2020-07-16 LAB — HBA1C MFR BLD: 6.3 %

## 2020-07-16 PROCEDURE — 99213 OFFICE O/P EST LOW 20 MIN: CPT | Performed by: INTERNAL MEDICINE

## 2020-07-16 PROCEDURE — 83036 HEMOGLOBIN GLYCOSYLATED A1C: CPT | Performed by: INTERNAL MEDICINE

## 2020-07-16 RX ORDER — BLOOD-GLUCOSE METER
1 KIT MISCELLANEOUS DAILY
Qty: 1 EACH | Refills: 0 | Status: SHIPPED | OUTPATIENT
Start: 2020-07-16

## 2020-07-16 RX ORDER — LOSARTAN POTASSIUM 50 MG/1
50 TABLET ORAL DAILY
COMMUNITY
Start: 2020-04-24 | End: 2020-07-28

## 2020-07-16 RX ORDER — LANCETS 30 GAUGE
1 EACH MISCELLANEOUS DAILY
Qty: 100 EACH | Refills: 3 | Status: SHIPPED | OUTPATIENT
Start: 2020-07-16

## 2020-07-16 NOTE — PROGRESS NOTES
Subjective   Julia Diaz is a 65 y.o. female.   Chief Complaint   Patient presents with   • Hypertension     3 MONTH FOLLOW UP   • Prediabetes     3 MONTH FOLLOW UP - A1C 6.3       She is here for routine follow-up of her diabetes.  She is not yet checking her sugars she was of the understanding that she was only a prediabetic.  He is taking all of her medications as prescribed been through quite a bit of stress with her  has had a stroke.       The following portions of the patient's history were reviewed and updated as appropriate: allergies, current medications, past family history, past medical history, past social history, past surgical history and problem list.    Review of Systems   Constitutional: Negative for activity change, appetite change, fatigue, fever, unexpected weight gain and unexpected weight loss.   HENT: Negative for swollen glands, trouble swallowing and voice change.    Eyes: Negative for blurred vision and visual disturbance.   Respiratory: Negative for cough and shortness of breath.    Cardiovascular: Negative for chest pain, palpitations and leg swelling.   Gastrointestinal: Negative for abdominal pain, constipation, diarrhea, nausea, vomiting and indigestion.   Endocrine: Negative for cold intolerance, heat intolerance, polydipsia and polyphagia.   Genitourinary: Negative for dysuria and frequency.   Musculoskeletal: Negative for arthralgias, back pain, joint swelling and neck pain.   Skin: Negative for color change, rash and skin lesions.   Neurological: Negative for dizziness, weakness, headache, memory problem and confusion.   Hematological: Does not bruise/bleed easily.   Psychiatric/Behavioral: Negative for agitation, hallucinations and suicidal ideas. The patient is not nervous/anxious.        Objective   Past Medical History:   Diagnosis Date   • Acid reflux    • Allergic    • Arthritis    • Elevated cholesterol    • Sinusitis       Past Surgical History:   Procedure  Laterality Date   • CARPAL TUNNEL RELEASE     •  SECTION     • HYSTERECTOMY     • INNER EAR SURGERY     • REPLACEMENT TOTAL KNEE          Current Outpatient Medications:   •  albuterol sulfate  (90 Base) MCG/ACT inhaler, Inhale 2 puffs Every 4 (Four) Hours As Needed for Wheezing or Shortness of Air., Disp: 1 inhaler, Rfl: 0  •  esomeprazole (nexIUM) 40 MG capsule, Take 1 capsule by mouth Every Morning Before Breakfast., Disp: 90 capsule, Rfl: 3  •  ezetimibe (Zetia) 10 MG tablet, Take 1 tablet by mouth Daily., Disp: 90 tablet, Rfl: 3  •  loratadine (CLARITIN) 10 MG tablet, Take  by mouth., Disp: , Rfl:   •  losartan (COZAAR) 50 MG tablet, Take 50 tablets by mouth Daily., Disp: , Rfl:   •  Naproxen Sodium (ALEVE PO), Take  by mouth., Disp: , Rfl:   •  glucose blood test strip, 1 daily, Disp: 100 each, Rfl: 3  •  glucose monitor monitoring kit, 1 each Daily., Disp: 1 each, Rfl: 0  •  Lancets misc, 1 each Daily., Disp: 100 each, Rfl: 3     Vitals:    20 1529   BP: 146/78   Pulse: 91   Temp: 97.9 °F (36.6 °C)   SpO2: 97%         20  1529   Weight: 90.4 kg (199 lb 6.4 oz)     Patient's Body mass index is 35.89 kg/m². BMI is above normal parameters. Recommendations include: exercise counseling and nutrition counseling.      Physical Exam   Constitutional: She is oriented to person, place, and time. She appears well-developed and well-nourished.   HENT:   Head: Normocephalic and atraumatic.   Right Ear: External ear normal.   Left Ear: External ear normal.   Nose: Nose normal.   Mouth/Throat: Oropharynx is clear and moist.   Eyes: Pupils are equal, round, and reactive to light. Conjunctivae and EOM are normal.   Neck: Normal range of motion. Neck supple. No thyromegaly present.   Cardiovascular: Normal rate, regular rhythm, normal heart sounds and intact distal pulses.   Pulmonary/Chest: Effort normal and breath sounds normal.   Abdominal: Soft. Bowel sounds are normal.    Julia had a diabetic  foot exam performed today.  Lymphadenopathy:     She has no cervical adenopathy.   Neurological: She is alert and oriented to person, place, and time.   Skin: Skin is warm and dry.   Psychiatric: She has a normal mood and affect. Her behavior is normal. Thought content normal.   Nursing note and vitals reviewed.            Assessment/Plan   Diagnoses and all orders for this visit:    1. Impaired fasting glucose (Primary)  -     POC Glycosylated Hemoglobin (Hb A1C)    2. Hypertension, benign    3. Hypercholesteremia    Other orders  -     glucose monitor monitoring kit; 1 each Daily.  Dispense: 1 each; Refill: 0  -     Lancets misc; 1 each Daily.  Dispense: 100 each; Refill: 3  -     glucose blood test strip; 1 daily  Dispense: 100 each; Refill: 3      At this point we are giving patient a diabetic diet also giving her strips and asking her to check her sugars on a glucometer on a regular basis.  We will follow-up with her in 6months she has family member members are in the healthcare field and can assist her in using the glucometer.           Answers for HPI/ROS submitted by the patient on 7/15/2020   What is the primary reason for your visit?: Other  Please describe your symptoms.: this is a follow up for blood pressure i think  Have you had these symptoms before?: Yes  How long have you been having these symptoms?: Greater than 2 weeks  Please list any medications you are currently taking for this condition.: ezetimibe 10 mg

## 2020-07-28 RX ORDER — LOSARTAN POTASSIUM 50 MG/1
TABLET ORAL
Qty: 90 TABLET | Refills: 3 | Status: SHIPPED | OUTPATIENT
Start: 2020-07-28 | End: 2021-01-19 | Stop reason: SDUPTHER

## 2021-01-19 ENCOUNTER — OFFICE VISIT (OUTPATIENT)
Dept: INTERNAL MEDICINE | Facility: CLINIC | Age: 66
End: 2021-01-19

## 2021-01-19 VITALS
TEMPERATURE: 97.3 F | SYSTOLIC BLOOD PRESSURE: 150 MMHG | WEIGHT: 186 LBS | DIASTOLIC BLOOD PRESSURE: 86 MMHG | HEART RATE: 85 BPM | BODY MASS INDEX: 32.96 KG/M2 | HEIGHT: 63 IN | OXYGEN SATURATION: 98 %

## 2021-01-19 DIAGNOSIS — Z11.59 ENCOUNTER FOR HEPATITIS C SCREENING TEST FOR LOW RISK PATIENT: ICD-10-CM

## 2021-01-19 DIAGNOSIS — Z78.0 POST-MENOPAUSAL: ICD-10-CM

## 2021-01-19 DIAGNOSIS — E55.9 VITAMIN D DEFICIENCY: ICD-10-CM

## 2021-01-19 DIAGNOSIS — Z12.31 SCREENING MAMMOGRAM, ENCOUNTER FOR: Primary | ICD-10-CM

## 2021-01-19 DIAGNOSIS — E11.9 ENCOUNTER FOR DIABETIC FOOT EXAM (HCC): ICD-10-CM

## 2021-01-19 DIAGNOSIS — R73.01 IMPAIRED FASTING GLUCOSE: ICD-10-CM

## 2021-01-19 DIAGNOSIS — E78.00 HYPERCHOLESTEREMIA: ICD-10-CM

## 2021-01-19 DIAGNOSIS — I10 HYPERTENSION, BENIGN: ICD-10-CM

## 2021-01-19 LAB — HBA1C MFR BLD: 6 %

## 2021-01-19 PROCEDURE — 83036 HEMOGLOBIN GLYCOSYLATED A1C: CPT | Performed by: INTERNAL MEDICINE

## 2021-01-19 PROCEDURE — G0438 PPPS, INITIAL VISIT: HCPCS | Performed by: INTERNAL MEDICINE

## 2021-01-19 RX ORDER — LOSARTAN POTASSIUM 100 MG/1
100 TABLET ORAL DAILY
Qty: 90 TABLET | Refills: 3 | Status: SHIPPED | OUTPATIENT
Start: 2021-01-19 | End: 2022-02-10 | Stop reason: ALTCHOICE

## 2021-01-19 NOTE — PROGRESS NOTES
The ABCs of the Annual Wellness Visit  Initial Medicare Wellness Visit    Chief Complaint   Patient presents with   • Medicare Wellness-Initial Visit       Subjective   History of Present Illness:  Julia Diaz is a 65 y.o. female who presents for an Initial Medicare Wellness Visit.    HEALTH RISK ASSESSMENT    Recent Hospitalizations:  No hospitalization(s) within the last year.    Current Medical Providers:  Patient Care Team:  Reyes Damon MD as PCP - General (Internal Medicine)  Reyes Damon MD as PCP - Internal Medicine (Internal Medicine)    Smoking Status:  Social History     Tobacco Use   Smoking Status Current Every Day Smoker   • Packs/day: 1.00   • Years: 50.00   • Pack years: 50.00   Smokeless Tobacco Never Used       Alcohol Consumption:  Social History     Substance and Sexual Activity   Alcohol Use No       Depression Screen:   PHQ-2/PHQ-9 Depression Screening 4/15/2020   Little interest or pleasure in doing things 0   Feeling down, depressed, or hopeless 0   Total Score 0       Fall Risk Screen:  STEADI Fall Risk Assessment has not been completed.    Health Habits and Functional and Cognitive Screening:  No flowsheet data found.      Does the patient have evidence of cognitive impairment? No    Asprin use counseling:Does not need ASA (and currently is not on it)    Age-appropriate Screening Schedule:  Refer to the list below for future screening recommendations based on patient's age, sex and/or medical conditions. Orders for these recommended tests are listed in the plan section. The patient has been provided with a written plan.    Health Maintenance   Topic Date Due   • COLONOSCOPY  1955   • TDAP/TD VACCINES (1 - Tdap) 02/13/1972   • ZOSTER VACCINE (1 of 2) 02/13/2003   • MAMMOGRAM  06/10/2017   • LIPID PANEL  06/10/2017   • INFLUENZA VACCINE  08/01/2020          The following portions of the patient's history were reviewed and updated as appropriate: allergies, current  medications, past family history, past medical history, past social history, past surgical history and problem list.    Outpatient Medications Prior to Visit   Medication Sig Dispense Refill   • albuterol sulfate  (90 Base) MCG/ACT inhaler Inhale 2 puffs Every 4 (Four) Hours As Needed for Wheezing or Shortness of Air. 1 inhaler 0   • esomeprazole (nexIUM) 40 MG capsule Take 1 capsule by mouth Every Morning Before Breakfast. 90 capsule 3   • ezetimibe (Zetia) 10 MG tablet Take 1 tablet by mouth Daily. 90 tablet 3   • glucose blood test strip 1 daily 100 each 3   • glucose monitor monitoring kit 1 each Daily. 1 each 0   • Lancets misc 1 each Daily. 100 each 3   • loratadine (CLARITIN) 10 MG tablet Take  by mouth.     • losartan (COZAAR) 50 MG tablet TAKE ONE TABLET BY MOUTH DAILY 90 tablet 3   • Naproxen Sodium (ALEVE PO) Take  by mouth.       No facility-administered medications prior to visit.        Patient Active Problem List   Diagnosis   • Abscess, perirectal   • Anxiety   • Arthritis of knee   • Arthropathy, multiple sites   • Gastroesophageal reflux disease without esophagitis   • Hypercholesteremia   • Hypertension, benign   • Impaired fasting glucose   • Insomnia, controlled   • Tobacco use   • Vitamin D deficiency       Advanced Care Planning:  ACP discussion was held with the patient during this visit. Patient does not have an advance directive, information provided.    Review of Systems   Constitutional: Negative for activity change, appetite change and chills.   HENT: Negative for congestion, ear pain and facial swelling.    Eyes: Negative for pain, discharge and itching.   Respiratory: Negative for apnea, cough and shortness of breath.    Cardiovascular: Negative for chest pain, palpitations and leg swelling.   Gastrointestinal: Negative for abdominal distention, abdominal pain and anal bleeding.   Endocrine: Negative for cold intolerance and heat intolerance.   Genitourinary: Negative for  difficulty urinating, dysuria and flank pain.   Musculoskeletal: Negative for arthralgias, back pain and joint swelling.   Skin: Negative for color change, pallor and rash.   Allergic/Immunologic: Negative for environmental allergies and food allergies.   Neurological: Negative for dizziness and facial asymmetry.   Hematological: Negative for adenopathy. Does not bruise/bleed easily.   Psychiatric/Behavioral: Negative for agitation, behavioral problems and confusion.       Compared to one year ago, the patient feels her physical health is the same.  Compared to one year ago, the patient feels her mental health is the same.    Reviewed chart for potential of high risk medication in the elderly: yes  Reviewed chart for potential of harmful drug interactions in the elderly:yes    Objective       There were no vitals filed for this visit.    There is no height or weight on file to calculate BMI.  Discussed the patient's BMI with her. The BMI is above average; BMI management plan is completed.    Physical Exam  Constitutional:       Appearance: Normal appearance. She is well-developed.   HENT:      Head: Normocephalic and atraumatic.      Right Ear: External ear normal.      Left Ear: External ear normal.   Eyes:      Extraocular Movements: Extraocular movements intact.      Conjunctiva/sclera: Conjunctivae normal.      Pupils: Pupils are equal, round, and reactive to light.   Neck:      Musculoskeletal: Normal range of motion and neck supple. No neck rigidity.      Vascular: No carotid bruit.   Cardiovascular:      Rate and Rhythm: Normal rate and regular rhythm.      Pulses: Normal pulses.      Heart sounds: Normal heart sounds. No murmur. No gallop.    Pulmonary:      Effort: Pulmonary effort is normal.      Breath sounds: Normal breath sounds.   Musculoskeletal: Normal range of motion.         General: No swelling or tenderness.   Skin:     General: Skin is warm and dry.   Neurological:      General: No focal deficit  present.      Mental Status: She is alert and oriented to person, place, and time.   Psychiatric:         Mood and Affect: Mood normal.         Behavior: Behavior normal.               Assessment/Plan   Medicare Risks and Personalized Health Plan  CMS Preventative Services Quick Reference  Advance Directive Discussion  Colon Cancer Screening  Immunizations Discussed/Encouraged (specific immunizations; adacel Tdap, Influenza, Pneumococcal 23, Prevnar and Shingrix )  Osteoprorosis Risk    The above risks/problems have been discussed with the patient.  Pertinent information has been shared with the patient in the After Visit Summary.  Follow up plans and orders are seen below in the Assessment/Plan Section.    Diagnoses and all orders for this visit:    1. Encounter for diabetic foot exam (CMS/[facility]) (Primary)  -     POC Glycosylated Hemoglobin (Hb A1C)    2. Hypercholesteremia  -     CBC & Differential  -     Comprehensive Metabolic Panel  -     Urinalysis With Microscopic - Urine, Clean Catch  -     TSH  -     Lipid Panel  -     Uric Acid  -     Cancel: MicroAlbumin, Urine, Random - Urine, Clean Catch    3. Hypertension, benign  -     CBC & Differential  -     Comprehensive Metabolic Panel  -     Urinalysis With Microscopic - Urine, Clean Catch  -     TSH  -     Lipid Panel  -     Uric Acid  -     Cancel: MicroAlbumin, Urine, Random - Urine, Clean Catch    4. Impaired fasting glucose  -     CBC & Differential  -     Comprehensive Metabolic Panel  -     Urinalysis With Microscopic - Urine, Clean Catch  -     TSH  -     Lipid Panel  -     Uric Acid  -     Cancel: MicroAlbumin, Urine, Random - Urine, Clean Catch  -     POC Glycosylated Hemoglobin (Hb A1C)    5. Vitamin D deficiency  -     Vitamin D 25 Hydroxy    6. Encounter for hepatitis C screening test for low risk patient  -     Hepatitis C Antibody      Follow Up:  No follow-ups on file.  This is patient's annual examination I have ordered additional laboratory.  Her  blood pressure systolic is elevated would like to see her systolic in a better range we are going to ask her to lose some weight monitor her diet and exercise and hopefully she can do this and return to a better reading like she had in April of last year we will see her back in 6 months for follow-up.    An After Visit Summary and PPPS were given to the patient.

## 2021-01-20 LAB
25(OH)D3+25(OH)D2 SERPL-MCNC: 31.4 NG/ML (ref 30–100)
ALBUMIN SERPL-MCNC: 4.2 G/DL (ref 3.5–5.2)
ALBUMIN/GLOB SERPL: 2 G/DL
ALP SERPL-CCNC: 94 U/L (ref 39–117)
ALT SERPL-CCNC: 15 U/L (ref 1–33)
APPEARANCE UR: CLEAR
AST SERPL-CCNC: 17 U/L (ref 1–32)
BACTERIA #/AREA URNS HPF: ABNORMAL /HPF
BASOPHILS # BLD AUTO: 0.08 10*3/MM3 (ref 0–0.2)
BASOPHILS NFR BLD AUTO: 0.7 % (ref 0–1.5)
BILIRUB SERPL-MCNC: <0.2 MG/DL (ref 0–1.2)
BILIRUB UR QL STRIP: NEGATIVE
BUN SERPL-MCNC: 13 MG/DL (ref 8–23)
BUN/CREAT SERPL: 15.9 (ref 7–25)
CALCIUM SERPL-MCNC: 9.4 MG/DL (ref 8.6–10.5)
CASTS URNS MICRO: ABNORMAL
CHLORIDE SERPL-SCNC: 103 MMOL/L (ref 98–107)
CHOLEST SERPL-MCNC: 188 MG/DL (ref 0–200)
CO2 SERPL-SCNC: 26.3 MMOL/L (ref 22–29)
COLOR UR: YELLOW
CREAT SERPL-MCNC: 0.82 MG/DL (ref 0.57–1)
EOSINOPHIL # BLD AUTO: 0.21 10*3/MM3 (ref 0–0.4)
EOSINOPHIL NFR BLD AUTO: 1.9 % (ref 0.3–6.2)
EPI CELLS #/AREA URNS HPF: ABNORMAL /HPF
ERYTHROCYTE [DISTWIDTH] IN BLOOD BY AUTOMATED COUNT: 13 % (ref 12.3–15.4)
GLOBULIN SER CALC-MCNC: 2.1 GM/DL
GLUCOSE SERPL-MCNC: 90 MG/DL (ref 65–99)
GLUCOSE UR QL: NEGATIVE
HCT VFR BLD AUTO: 45.1 % (ref 34–46.6)
HCV AB S/CO SERPL IA: <0.1 S/CO RATIO (ref 0–0.9)
HDLC SERPL-MCNC: 34 MG/DL (ref 40–60)
HGB BLD-MCNC: 15 G/DL (ref 12–15.9)
HGB UR QL STRIP: NEGATIVE
IMM GRANULOCYTES # BLD AUTO: 0.04 10*3/MM3 (ref 0–0.05)
IMM GRANULOCYTES NFR BLD AUTO: 0.4 % (ref 0–0.5)
KETONES UR QL STRIP: NEGATIVE
LDLC SERPL CALC-MCNC: 102 MG/DL (ref 0–100)
LEUKOCYTE ESTERASE UR QL STRIP: NEGATIVE
LYMPHOCYTES # BLD AUTO: 4.6 10*3/MM3 (ref 0.7–3.1)
LYMPHOCYTES NFR BLD AUTO: 42.4 % (ref 19.6–45.3)
MCH RBC QN AUTO: 31 PG (ref 26.6–33)
MCHC RBC AUTO-ENTMCNC: 33.3 G/DL (ref 31.5–35.7)
MCV RBC AUTO: 93.2 FL (ref 79–97)
MONOCYTES # BLD AUTO: 0.75 10*3/MM3 (ref 0.1–0.9)
MONOCYTES NFR BLD AUTO: 6.9 % (ref 5–12)
NEUTROPHILS # BLD AUTO: 5.16 10*3/MM3 (ref 1.7–7)
NEUTROPHILS NFR BLD AUTO: 47.7 % (ref 42.7–76)
NITRITE UR QL STRIP: NEGATIVE
NRBC BLD AUTO-RTO: 0 /100 WBC (ref 0–0.2)
PH UR STRIP: 6.5 [PH] (ref 5–8)
PLATELET # BLD AUTO: 319 10*3/MM3 (ref 140–450)
POTASSIUM SERPL-SCNC: 4.5 MMOL/L (ref 3.5–5.2)
PROT SERPL-MCNC: 6.3 G/DL (ref 6–8.5)
PROT UR QL STRIP: NEGATIVE
RBC # BLD AUTO: 4.84 10*6/MM3 (ref 3.77–5.28)
RBC #/AREA URNS HPF: ABNORMAL /HPF
SODIUM SERPL-SCNC: 140 MMOL/L (ref 136–145)
SP GR UR: 1.02 (ref 1–1.03)
TRIGL SERPL-MCNC: 306 MG/DL (ref 0–150)
TSH SERPL DL<=0.005 MIU/L-ACNC: 1.14 UIU/ML (ref 0.27–4.2)
URATE SERPL-MCNC: 5.3 MG/DL (ref 2.4–5.7)
UROBILINOGEN UR STRIP-MCNC: NORMAL MG/DL
VLDLC SERPL CALC-MCNC: 52 MG/DL (ref 5–40)
WBC # BLD AUTO: 10.84 10*3/MM3 (ref 3.4–10.8)
WBC #/AREA URNS HPF: ABNORMAL /HPF

## 2021-01-20 RX ORDER — EZETIMIBE 10 MG/1
TABLET ORAL
Qty: 90 TABLET | Refills: 3 | Status: SHIPPED | OUTPATIENT
Start: 2021-01-20 | End: 2022-03-08 | Stop reason: SDUPTHER

## 2021-03-05 DIAGNOSIS — Z78.0 POST-MENOPAUSAL: ICD-10-CM

## 2021-03-05 DIAGNOSIS — Z12.31 SCREENING MAMMOGRAM, ENCOUNTER FOR: ICD-10-CM

## 2021-03-08 ENCOUNTER — TELEPHONE (OUTPATIENT)
Dept: INTERNAL MEDICINE | Facility: CLINIC | Age: 66
End: 2021-03-08

## 2021-03-23 ENCOUNTER — TELEPHONE (OUTPATIENT)
Dept: INTERNAL MEDICINE | Facility: CLINIC | Age: 66
End: 2021-03-23

## 2021-03-23 NOTE — TELEPHONE ENCOUNTER
Called pt regarding BMD and informed of very little change since 2015.  Has the option of going on Fosamax to try to prevent or slow down bone loss, or can do just wait and repeat in 2 years.  She wants to wait and repeat in 2 years.

## 2021-05-03 RX ORDER — ESOMEPRAZOLE MAGNESIUM 40 MG/1
CAPSULE, DELAYED RELEASE ORAL
Qty: 90 CAPSULE | Refills: 3 | Status: SHIPPED | OUTPATIENT
Start: 2021-05-03 | End: 2022-06-13 | Stop reason: SDUPTHER

## 2021-07-20 ENCOUNTER — OFFICE VISIT (OUTPATIENT)
Dept: INTERNAL MEDICINE | Facility: CLINIC | Age: 66
End: 2021-07-20

## 2021-07-20 VITALS
DIASTOLIC BLOOD PRESSURE: 92 MMHG | BODY MASS INDEX: 32.6 KG/M2 | SYSTOLIC BLOOD PRESSURE: 160 MMHG | WEIGHT: 184 LBS | HEART RATE: 83 BPM | HEIGHT: 63 IN | TEMPERATURE: 97.9 F | OXYGEN SATURATION: 98 %

## 2021-07-20 DIAGNOSIS — I10 HYPERTENSION, BENIGN: ICD-10-CM

## 2021-07-20 DIAGNOSIS — E78.00 HYPERCHOLESTEREMIA: Primary | ICD-10-CM

## 2021-07-20 DIAGNOSIS — R73.01 IMPAIRED FASTING GLUCOSE: ICD-10-CM

## 2021-07-20 LAB — HBA1C MFR BLD: 5.9 %

## 2021-07-20 PROCEDURE — 99213 OFFICE O/P EST LOW 20 MIN: CPT | Performed by: INTERNAL MEDICINE

## 2021-07-20 PROCEDURE — 83036 HEMOGLOBIN GLYCOSYLATED A1C: CPT | Performed by: INTERNAL MEDICINE

## 2021-07-20 NOTE — PROGRESS NOTES
Subjective   Julia Diaz is a 66 y.o. female.   Chief Complaint   Patient presents with   • Hypertension     6 month follow up    • Prediabetes     a1c: 5.9       History of Present Illness patient has hypertension impaired fasting glucose.  She is taking her medication as prescribed she is not adherent to a strict diabetic diet admits to using sugars and her diet.    The following portions of the patient's history were reviewed and updated as appropriate: allergies, current medications, past family history, past medical history, past social history, past surgical history and problem list.    Review of Systems   Constitutional: Negative for activity change, appetite change, fatigue, fever, unexpected weight gain and unexpected weight loss.   HENT: Negative for swollen glands, trouble swallowing and voice change.    Eyes: Negative for blurred vision and visual disturbance.   Respiratory: Negative for cough and shortness of breath.    Cardiovascular: Negative for chest pain, palpitations and leg swelling.   Gastrointestinal: Negative for abdominal pain, constipation, diarrhea, nausea, vomiting and indigestion.   Endocrine: Negative for cold intolerance, heat intolerance, polydipsia and polyphagia.   Genitourinary: Negative for dysuria and frequency.   Musculoskeletal: Negative for arthralgias, back pain, joint swelling and neck pain.   Skin: Negative for color change, rash and skin lesions.   Neurological: Negative for dizziness, weakness, headache, memory problem and confusion.   Hematological: Does not bruise/bleed easily.   Psychiatric/Behavioral: Negative for agitation, hallucinations and suicidal ideas. The patient is not nervous/anxious.        Objective   Past Medical History:   Diagnosis Date   • Acid reflux    • Allergic    • Arthritis    • Elevated cholesterol    • Sinusitis       Past Surgical History:   Procedure Laterality Date   • CARPAL TUNNEL RELEASE     •  SECTION     • HYSTERECTOMY     •  INNER EAR SURGERY     • REPLACEMENT TOTAL KNEE          Current Outpatient Medications:   •  esomeprazole (nexIUM) 40 MG capsule, TAKE ONE CAPSULE BY MOUTH EVERY MORNING BEFORE BREAKFAST, Disp: 90 capsule, Rfl: 3  •  ezetimibe (ZETIA) 10 MG tablet, TAKE ONE TABLET BY MOUTH DAILY, Disp: 90 tablet, Rfl: 3  •  glucose blood test strip, 1 daily, Disp: 100 each, Rfl: 3  •  glucose monitor monitoring kit, 1 each Daily., Disp: 1 each, Rfl: 0  •  Lancets misc, 1 each Daily., Disp: 100 each, Rfl: 3  •  loratadine (CLARITIN) 10 MG tablet, Take  by mouth., Disp: , Rfl:   •  losartan (COZAAR) 100 MG tablet, Take 1 tablet by mouth Daily., Disp: 90 tablet, Rfl: 3  •  Naproxen Sodium (ALEVE PO), Take  by mouth., Disp: , Rfl:      Vitals:    07/20/21 1450   BP: 160/92   Pulse: 83   Temp: 97.9 °F (36.6 °C)   SpO2: 98%         07/20/21  1450   Weight: 83.5 kg (184 lb)         Physical Exam  Constitutional:       Appearance: She is well-developed.   HENT:      Head: Normocephalic and atraumatic.   Eyes:      Pupils: Pupils are equal, round, and reactive to light.   Cardiovascular:      Rate and Rhythm: Normal rate and regular rhythm.   Pulmonary:      Effort: Pulmonary effort is normal.      Breath sounds: Normal breath sounds.   Abdominal:      General: Bowel sounds are normal.      Palpations: Abdomen is soft.   Musculoskeletal:         General: Normal range of motion.      Cervical back: Normal range of motion and neck supple.   Skin:     General: Skin is warm and dry.   Neurological:      Mental Status: She is alert and oriented to person, place, and time.   Psychiatric:         Behavior: Behavior normal.               Assessment/Plan   Diagnoses and all orders for this visit:    1. Hypercholesteremia (Primary)    2. Hypertension, benign    3. Impaired fasting glucose    Other orders  -     POC Glycosylated Hemoglobin (Hb A1C)    Patient is taking her medications as prescribed she remains in the impaired fasting glucose category  her blood pressure is elevated I spent some time talking to her about that of asked her to begin monitoring her blood pressures if she is consistently running greater than 130/90 I would like to add that Acacian to her regimen.

## 2021-12-28 ENCOUNTER — HOSPITAL ENCOUNTER (OUTPATIENT)
Dept: GENERAL RADIOLOGY | Facility: HOSPITAL | Age: 66
Discharge: HOME | End: 2021-12-28
Admitting: NURSE PRACTITIONER

## 2021-12-28 ENCOUNTER — OFFICE VISIT (OUTPATIENT)
Dept: INTERNAL MEDICINE | Facility: CLINIC | Age: 66
End: 2021-12-28

## 2021-12-28 VITALS
OXYGEN SATURATION: 98 % | WEIGHT: 188.8 LBS | DIASTOLIC BLOOD PRESSURE: 82 MMHG | SYSTOLIC BLOOD PRESSURE: 130 MMHG | BODY MASS INDEX: 33.98 KG/M2 | HEART RATE: 76 BPM | TEMPERATURE: 97.3 F

## 2021-12-28 DIAGNOSIS — J22 LOWER RESPIRATORY TRACT INFECTION: Primary | ICD-10-CM

## 2021-12-28 DIAGNOSIS — R05.9 COUGH: ICD-10-CM

## 2021-12-28 DIAGNOSIS — J22 LOWER RESPIRATORY TRACT INFECTION: ICD-10-CM

## 2021-12-28 PROCEDURE — 71046 X-RAY EXAM CHEST 2 VIEWS: CPT

## 2021-12-28 PROCEDURE — 99214 OFFICE O/P EST MOD 30 MIN: CPT | Performed by: NURSE PRACTITIONER

## 2021-12-28 RX ORDER — AZITHROMYCIN 250 MG/1
TABLET, FILM COATED ORAL
Qty: 6 TABLET | Refills: 0 | Status: SHIPPED | OUTPATIENT
Start: 2021-12-28 | End: 2022-01-04

## 2021-12-28 RX ORDER — ALBUTEROL SULFATE 90 UG/1
2 AEROSOL, METERED RESPIRATORY (INHALATION) EVERY 4 HOURS PRN
Qty: 18 G | Refills: 0 | Status: SHIPPED | OUTPATIENT
Start: 2021-12-28 | End: 2022-01-20

## 2021-12-28 RX ORDER — METHYLPREDNISOLONE 4 MG/1
TABLET ORAL
Qty: 1 EACH | Refills: 0 | Status: SHIPPED | OUTPATIENT
Start: 2021-12-28 | End: 2022-01-04

## 2021-12-28 NOTE — PROGRESS NOTES
Subjective   Julia iDaz is a 66 y.o. female.   Chief Complaint   Patient presents with   • Cough     Sunday had diarrhea, cough x 1 week, coughing up clear        Julia presents today for cough that is ongoing x 2 1/2 weeks.  She states it initially started with Diarrhea that suddenly came on while at [employer].  Once she went home she developed sinus pressure and cough.  She took home COVID test on 17th or 18th that was negative.  She did have some possible COVID exposure on 12/1/21 around her daughter who tested positive on 12/7/21.  She otherwise stayed away from her daughter.  Symptoms in general are now resolved except the deep cough.      Cough  This is a new problem. The current episode started 1 to 4 weeks ago. The cough is non-productive. Associated symptoms include shortness of breath (mild). Pertinent negatives include no chest pain, fever, nasal congestion, rash, weight loss or wheezing. Treatments tried: Mucinex; Delsym. The treatment provided moderate (Mucinex seems to help) relief.    No changes in taste or smell.  Not vaccinated for COVID-19    The following portions of the patient's history were reviewed and updated as appropriate: allergies, current medications, past family history, past medical history, past social history, past surgical history and problem list.    Review of Systems   Constitutional: Negative for activity change, appetite change, fatigue, fever, unexpected weight gain and unexpected weight loss.   HENT: Negative for swollen glands, trouble swallowing and voice change.    Eyes: Negative for blurred vision and visual disturbance.   Respiratory: Positive for cough and shortness of breath (mild). Negative for wheezing.    Cardiovascular: Negative for chest pain, palpitations and leg swelling.   Gastrointestinal: Positive for diarrhea (has resolved). Negative for abdominal pain, constipation, nausea, vomiting and indigestion.   Endocrine: Negative for cold intolerance, heat  intolerance, polydipsia and polyphagia.   Genitourinary: Negative for dysuria and frequency.   Musculoskeletal: Negative for arthralgias, back pain, joint swelling and neck pain.   Skin: Negative for color change, rash and skin lesions.   Neurological: Negative for dizziness, weakness, headache, memory problem and confusion.   Hematological: Does not bruise/bleed easily.   Psychiatric/Behavioral: Negative for agitation, hallucinations and suicidal ideas. The patient is not nervous/anxious.        Objective   Past Medical History:   Diagnosis Date   • Acid reflux    • Allergic    • Arthritis    • Elevated cholesterol    • Sinusitis       Past Surgical History:   Procedure Laterality Date   • CARPAL TUNNEL RELEASE     •  SECTION     • HYSTERECTOMY     • INNER EAR SURGERY     • REPLACEMENT TOTAL KNEE          Current Outpatient Medications:   •  esomeprazole (nexIUM) 40 MG capsule, TAKE ONE CAPSULE BY MOUTH EVERY MORNING BEFORE BREAKFAST, Disp: 90 capsule, Rfl: 3  •  ezetimibe (ZETIA) 10 MG tablet, TAKE ONE TABLET BY MOUTH DAILY, Disp: 90 tablet, Rfl: 3  •  glucose blood test strip, 1 daily, Disp: 100 each, Rfl: 3  •  glucose monitor monitoring kit, 1 each Daily., Disp: 1 each, Rfl: 0  •  Lancets misc, 1 each Daily., Disp: 100 each, Rfl: 3  •  loratadine (CLARITIN) 10 MG tablet, Take  by mouth., Disp: , Rfl:   •  losartan (COZAAR) 100 MG tablet, Take 1 tablet by mouth Daily., Disp: 90 tablet, Rfl: 3  •  Naproxen Sodium (ALEVE PO), Take  by mouth., Disp: , Rfl:   •  albuterol sulfate  (90 Base) MCG/ACT inhaler, Inhale 2 puffs Every 4 (Four) Hours As Needed for Wheezing., Disp: 18 g, Rfl: 0  •  azithromycin (Zithromax Z-Tim) 250 MG tablet, Take 2 tablets by mouth on day 1, then 1 tablet daily on days 2-5, Disp: 6 tablet, Rfl: 0  •  methylPREDNISolone (MEDROL) 4 MG dose pack, Take as directed on package instructions., Disp: 1 each, Rfl: 0      Vitals:    21 1433   BP: 130/82   Pulse: 76   Temp: 97.3  °F (36.3 °C)   SpO2: 98%         12/28/21  1433   Weight: 85.6 kg (188 lb 12.8 oz)       Body mass index is 33.98 kg/m².    Physical Exam  Constitutional:       General: She is not in acute distress.     Appearance: She is well-developed.   HENT:      Head: Normocephalic.      Right Ear: Tympanic membrane and external ear normal.      Left Ear: Tympanic membrane and external ear normal.      Mouth/Throat:      Mouth: Mucous membranes are moist. No oral lesions.      Pharynx: Oropharynx is clear. Posterior oropharyngeal erythema present.   Eyes:      Conjunctiva/sclera: Conjunctivae normal.   Cardiovascular:      Rate and Rhythm: Normal rate and regular rhythm.      Pulses: Normal pulses.      Heart sounds: Normal heart sounds.   Pulmonary:      Effort: Pulmonary effort is normal.      Breath sounds: Wheezing and rhonchi present. No decreased breath sounds.      Comments: Very congested cough; clears some with cough.  Faint wheezing heard throughout.    Skin:     General: Skin is warm and dry.   Neurological:      Mental Status: She is alert and oriented to person, place, and time.      Gait: Gait normal.   Psychiatric:         Mood and Affect: Mood normal.         Speech: Speech normal.         Behavior: Behavior normal.         Thought Content: Thought content normal.               Assessment/Plan   Diagnoses and all orders for this visit:    1. Lower respiratory tract infection (Primary)  -     XR Chest PA & Lateral; Future  -     azithromycin (Zithromax Z-Tim) 250 MG tablet; Take 2 tablets by mouth on day 1, then 1 tablet daily on days 2-5  Dispense: 6 tablet; Refill: 0  -     methylPREDNISolone (MEDROL) 4 MG dose pack; Take as directed on package instructions.  Dispense: 1 each; Refill: 0  -     albuterol sulfate  (90 Base) MCG/ACT inhaler; Inhale 2 puffs Every 4 (Four) Hours As Needed for Wheezing.  Dispense: 18 g; Refill: 0    2. Cough  -     COVID-19,[facility],NP/OP Swab in Transport Media or ESwab 72 HR  TAT - Swab, Anterior nasal    Will send for chest x-ray today.  Zpack, Medrol, and albuterol sent to the pharmacy.  I've discussed taking deep breaths and coughing and increasing water intake.  COVID-19 swab was completed though if this is COVID she is likely past her quarantine time.  I have went ahead and asked her to quarantine until COVID results are received.  If no improvement or symptoms worsen, will need further evaluation.

## 2021-12-29 ENCOUNTER — TELEPHONE (OUTPATIENT)
Dept: INTERNAL MEDICINE | Facility: CLINIC | Age: 66
End: 2021-12-29

## 2021-12-29 NOTE — TELEPHONE ENCOUNTER
----- Message from BELEM Nix sent at 12/28/2021  4:09 PM CST -----  X-ray shows questionable pneumonia.  Continue on current treatment and follow up for recheck in about 1 week.

## 2021-12-30 LAB
LABCORP SARS-COV-2, NAA 2 DAY TAT: NORMAL
SARS-COV-2 RNA RESP QL NAA+PROBE: NOT DETECTED

## 2022-01-04 ENCOUNTER — OFFICE VISIT (OUTPATIENT)
Dept: INTERNAL MEDICINE | Facility: CLINIC | Age: 67
End: 2022-01-04

## 2022-01-04 VITALS
OXYGEN SATURATION: 97 % | HEIGHT: 63 IN | BODY MASS INDEX: 33.98 KG/M2 | WEIGHT: 191.8 LBS | TEMPERATURE: 96.9 F | HEART RATE: 94 BPM | SYSTOLIC BLOOD PRESSURE: 116 MMHG | DIASTOLIC BLOOD PRESSURE: 80 MMHG

## 2022-01-04 DIAGNOSIS — J22 LOWER RESPIRATORY INFECTION: Primary | ICD-10-CM

## 2022-01-04 PROCEDURE — 99213 OFFICE O/P EST LOW 20 MIN: CPT | Performed by: NURSE PRACTITIONER

## 2022-01-04 NOTE — PROGRESS NOTES
Subjective   Julia Diaz is a 66 y.o. female.   Chief Complaint   Patient presents with   • Pneumonia     1 wk f/u, feels a lot better       Julia presents today for 1 week follow up for pneumonia.  She reports she is doing well and has finished treatment.  She denies fever or shortness of breath. She has not needed to use her inhaler.        The following portions of the patient's history were reviewed and updated as appropriate: allergies, current medications, past family history, past medical history, past social history, past surgical history and problem list.    Review of Systems   Constitutional: Negative for activity change, appetite change, fatigue, fever, unexpected weight gain and unexpected weight loss.   HENT: Negative for swollen glands, trouble swallowing and voice change.    Eyes: Negative for blurred vision and visual disturbance.   Respiratory: Negative for cough and shortness of breath.    Cardiovascular: Negative for chest pain, palpitations and leg swelling.   Gastrointestinal: Negative for abdominal pain, constipation, diarrhea, nausea, vomiting and indigestion.   Endocrine: Negative for cold intolerance, heat intolerance, polydipsia and polyphagia.   Genitourinary: Negative for dysuria and frequency.   Musculoskeletal: Negative for arthralgias, back pain, joint swelling and neck pain.   Skin: Negative for color change, rash and skin lesions.   Neurological: Negative for dizziness, weakness, headache, memory problem and confusion.   Hematological: Does not bruise/bleed easily.   Psychiatric/Behavioral: Negative for agitation, hallucinations and suicidal ideas. The patient is not nervous/anxious.        Objective   Past Medical History:   Diagnosis Date   • Acid reflux    • Allergic    • Arthritis    • Elevated cholesterol    • Sinusitis       Past Surgical History:   Procedure Laterality Date   • CARPAL TUNNEL RELEASE     •  SECTION     • HYSTERECTOMY     • INNER EAR SURGERY     •  REPLACEMENT TOTAL KNEE          Current Outpatient Medications:   •  albuterol sulfate  (90 Base) MCG/ACT inhaler, Inhale 2 puffs Every 4 (Four) Hours As Needed for Wheezing., Disp: 18 g, Rfl: 0  •  esomeprazole (nexIUM) 40 MG capsule, TAKE ONE CAPSULE BY MOUTH EVERY MORNING BEFORE BREAKFAST, Disp: 90 capsule, Rfl: 3  •  ezetimibe (ZETIA) 10 MG tablet, TAKE ONE TABLET BY MOUTH DAILY, Disp: 90 tablet, Rfl: 3  •  glucose blood test strip, 1 daily, Disp: 100 each, Rfl: 3  •  glucose monitor monitoring kit, 1 each Daily., Disp: 1 each, Rfl: 0  •  Lancets misc, 1 each Daily., Disp: 100 each, Rfl: 3  •  loratadine (CLARITIN) 10 MG tablet, Take  by mouth., Disp: , Rfl:   •  losartan (COZAAR) 100 MG tablet, Take 1 tablet by mouth Daily., Disp: 90 tablet, Rfl: 3  •  Naproxen Sodium (ALEVE PO), Take  by mouth., Disp: , Rfl:       Vitals:    01/04/22 1029   BP: 116/80   Pulse: 94   Temp: 96.9 °F (36.1 °C)   SpO2: 97%         01/04/22  1029   Weight: 87 kg (191 lb 12.8 oz)       Body mass index is 34.52 kg/m².    Physical Exam  Constitutional:       General: She is not in acute distress.     Appearance: She is well-developed.   HENT:      Head: Normocephalic.      Right Ear: External ear normal.      Left Ear: External ear normal.   Eyes:      Conjunctiva/sclera: Conjunctivae normal.   Cardiovascular:      Rate and Rhythm: Normal rate and regular rhythm.      Heart sounds: Normal heart sounds.   Pulmonary:      Effort: Pulmonary effort is normal.      Breath sounds: Normal breath sounds.   Skin:     General: Skin is warm and dry.   Neurological:      Mental Status: She is alert and oriented to person, place, and time.      Gait: Gait normal.   Psychiatric:         Mood and Affect: Mood normal.         Speech: Speech normal.         Behavior: Behavior normal.         Thought Content: Thought content normal.               Assessment/Plan   Diagnoses and all orders for this visit:    1. Lower respiratory infection  (Primary)  -     XR Chest PA & Lateral; Future    Julia is doing well and has finished treatment.  I have advised that she keep her follow up in 3 weeks with Dr. Damon and will have repeat chest x-ray in 4 weeks.  Follow up sooner if symptoms return.

## 2022-01-20 DIAGNOSIS — J22 LOWER RESPIRATORY TRACT INFECTION: ICD-10-CM

## 2022-01-20 RX ORDER — ALBUTEROL SULFATE 90 UG/1
AEROSOL, METERED RESPIRATORY (INHALATION)
Qty: 18 G | Refills: 2 | Status: SHIPPED | OUTPATIENT
Start: 2022-01-20

## 2022-01-21 ENCOUNTER — LAB (OUTPATIENT)
Dept: INTERNAL MEDICINE | Facility: CLINIC | Age: 67
End: 2022-01-21

## 2022-01-21 DIAGNOSIS — R73.01 IMPAIRED FASTING GLUCOSE: ICD-10-CM

## 2022-01-21 DIAGNOSIS — I10 HYPERTENSION, BENIGN: ICD-10-CM

## 2022-01-21 DIAGNOSIS — E55.9 VITAMIN D DEFICIENCY: ICD-10-CM

## 2022-01-21 DIAGNOSIS — Z79.899 ENCOUNTER FOR LONG-TERM CURRENT USE OF MEDICATION: ICD-10-CM

## 2022-01-21 DIAGNOSIS — E78.00 HYPERCHOLESTEREMIA: Primary | ICD-10-CM

## 2022-01-22 LAB
25(OH)D3+25(OH)D2 SERPL-MCNC: 30.2 NG/ML (ref 30–100)
ALBUMIN SERPL-MCNC: 4.1 G/DL (ref 3.5–5.2)
ALBUMIN/GLOB SERPL: 1.7 G/DL
ALP SERPL-CCNC: 95 U/L (ref 39–117)
ALT SERPL-CCNC: 18 U/L (ref 1–33)
APPEARANCE UR: CLEAR
AST SERPL-CCNC: 18 U/L (ref 1–32)
BACTERIA #/AREA URNS HPF: ABNORMAL /HPF
BASOPHILS # BLD AUTO: 0.07 10*3/MM3 (ref 0–0.2)
BASOPHILS NFR BLD AUTO: 0.9 % (ref 0–1.5)
BILIRUB SERPL-MCNC: 0.2 MG/DL (ref 0–1.2)
BILIRUB UR QL STRIP: NEGATIVE
BUN SERPL-MCNC: 12 MG/DL (ref 8–23)
BUN/CREAT SERPL: 15 (ref 7–25)
CALCIUM SERPL-MCNC: 9.6 MG/DL (ref 8.6–10.5)
CASTS URNS MICRO: ABNORMAL
CHLORIDE SERPL-SCNC: 103 MMOL/L (ref 98–107)
CHOLEST SERPL-MCNC: 206 MG/DL (ref 0–200)
CO2 SERPL-SCNC: 28.8 MMOL/L (ref 22–29)
COLOR UR: YELLOW
CREAT SERPL-MCNC: 0.8 MG/DL (ref 0.57–1)
EOSINOPHIL # BLD AUTO: 0.28 10*3/MM3 (ref 0–0.4)
EOSINOPHIL NFR BLD AUTO: 3.4 % (ref 0.3–6.2)
EPI CELLS #/AREA URNS HPF: ABNORMAL /HPF
ERYTHROCYTE [DISTWIDTH] IN BLOOD BY AUTOMATED COUNT: 13.2 % (ref 12.3–15.4)
GLOBULIN SER CALC-MCNC: 2.4 GM/DL
GLUCOSE SERPL-MCNC: 102 MG/DL (ref 65–99)
GLUCOSE UR QL STRIP: NEGATIVE
HBA1C MFR BLD: 6.2 % (ref 4.8–5.6)
HCT VFR BLD AUTO: 50.7 % (ref 34–46.6)
HDLC SERPL-MCNC: 39 MG/DL (ref 40–60)
HGB BLD-MCNC: 16.3 G/DL (ref 12–15.9)
HGB UR QL STRIP: NEGATIVE
IMM GRANULOCYTES # BLD AUTO: 0.03 10*3/MM3 (ref 0–0.05)
IMM GRANULOCYTES NFR BLD AUTO: 0.4 % (ref 0–0.5)
KETONES UR QL STRIP: NEGATIVE
LDLC SERPL CALC-MCNC: 127 MG/DL (ref 0–100)
LEUKOCYTE ESTERASE UR QL STRIP: NEGATIVE
LYMPHOCYTES # BLD AUTO: 2.74 10*3/MM3 (ref 0.7–3.1)
LYMPHOCYTES NFR BLD AUTO: 33.7 % (ref 19.6–45.3)
MCH RBC QN AUTO: 30.4 PG (ref 26.6–33)
MCHC RBC AUTO-ENTMCNC: 32.1 G/DL (ref 31.5–35.7)
MCV RBC AUTO: 94.4 FL (ref 79–97)
MONOCYTES # BLD AUTO: 0.53 10*3/MM3 (ref 0.1–0.9)
MONOCYTES NFR BLD AUTO: 6.5 % (ref 5–12)
NEUTROPHILS # BLD AUTO: 4.49 10*3/MM3 (ref 1.7–7)
NEUTROPHILS NFR BLD AUTO: 55.1 % (ref 42.7–76)
NITRITE UR QL STRIP: NEGATIVE
NRBC BLD AUTO-RTO: 0 /100 WBC (ref 0–0.2)
PH UR STRIP: 5.5 [PH] (ref 5–8)
PLATELET # BLD AUTO: 358 10*3/MM3 (ref 140–450)
POTASSIUM SERPL-SCNC: 5.3 MMOL/L (ref 3.5–5.2)
PROT SERPL-MCNC: 6.5 G/DL (ref 6–8.5)
PROT UR QL STRIP: NEGATIVE
RBC # BLD AUTO: 5.37 10*6/MM3 (ref 3.77–5.28)
RBC #/AREA URNS HPF: ABNORMAL /HPF
SODIUM SERPL-SCNC: 140 MMOL/L (ref 136–145)
SP GR UR STRIP: 1.02 (ref 1–1.03)
TRIGL SERPL-MCNC: 223 MG/DL (ref 0–150)
TSH SERPL-ACNC: 1.89 UIU/ML (ref 0.27–4.2)
URATE SERPL-MCNC: 5.9 MG/DL (ref 2.4–5.7)
UROBILINOGEN UR STRIP-MCNC: NORMAL MG/DL
VLDLC SERPL CALC-MCNC: 40 MG/DL (ref 5–40)
WBC # BLD AUTO: 8.14 10*3/MM3 (ref 3.4–10.8)
WBC #/AREA URNS HPF: ABNORMAL /HPF

## 2022-02-09 ENCOUNTER — OFFICE VISIT (OUTPATIENT)
Dept: INTERNAL MEDICINE | Facility: CLINIC | Age: 67
End: 2022-02-09

## 2022-02-09 VITALS
HEART RATE: 81 BPM | HEIGHT: 63 IN | SYSTOLIC BLOOD PRESSURE: 150 MMHG | WEIGHT: 193 LBS | TEMPERATURE: 97.1 F | OXYGEN SATURATION: 99 % | DIASTOLIC BLOOD PRESSURE: 100 MMHG | BODY MASS INDEX: 34.2 KG/M2

## 2022-02-09 DIAGNOSIS — R73.01 IMPAIRED FASTING GLUCOSE: ICD-10-CM

## 2022-02-09 DIAGNOSIS — M85.80 OSTEOPENIA, UNSPECIFIED LOCATION: ICD-10-CM

## 2022-02-09 DIAGNOSIS — L60.0 INGROWN TOENAIL: ICD-10-CM

## 2022-02-09 DIAGNOSIS — R01.1 HEART MURMUR: ICD-10-CM

## 2022-02-09 DIAGNOSIS — Z00.00 ENCOUNTER FOR SUBSEQUENT ANNUAL WELLNESS VISIT (AWV) IN MEDICARE PATIENT: Primary | ICD-10-CM

## 2022-02-09 DIAGNOSIS — E87.5 HYPERKALEMIA: ICD-10-CM

## 2022-02-09 DIAGNOSIS — Z12.31 SCREENING MAMMOGRAM, ENCOUNTER FOR: ICD-10-CM

## 2022-02-09 DIAGNOSIS — E78.00 HYPERCHOLESTEREMIA: ICD-10-CM

## 2022-02-09 DIAGNOSIS — I10 HYPERTENSION, BENIGN: ICD-10-CM

## 2022-02-09 PROCEDURE — 1159F MED LIST DOCD IN RCRD: CPT | Performed by: NURSE PRACTITIONER

## 2022-02-09 PROCEDURE — 1170F FXNL STATUS ASSESSED: CPT | Performed by: NURSE PRACTITIONER

## 2022-02-09 PROCEDURE — 1126F AMNT PAIN NOTED NONE PRSNT: CPT | Performed by: NURSE PRACTITIONER

## 2022-02-09 PROCEDURE — G0439 PPPS, SUBSEQ VISIT: HCPCS | Performed by: NURSE PRACTITIONER

## 2022-02-09 RX ORDER — MULTIVIT-MIN/IRON/FOLIC ACID/K 18-600-40
1 CAPSULE ORAL DAILY
Qty: 30 CAPSULE | Refills: 5 | Status: SHIPPED | OUTPATIENT
Start: 2022-02-09

## 2022-02-09 NOTE — PROGRESS NOTES
"QUICK REFERENCE INFORMATION:  The ABCs of the Annual Wellness Visit    Subsequent Medicare Wellness Visit    Julia presents today for her annual wellness exam. She denies concerns to address today. She is monitored for hypertension and hyperlipidemia.  She was recently treated for pneumonia which she states has greatly improved.  She denies trouble breathing or chest pain.  She does state she had not been drinking water well while ill but has since started to improve this.  Recent labs may reflect this with elevated H&H and potassium. She continues smoking but is concerned that she would not be able to tolerate quitting with anxiety.  She states she has talked with Dr. Damon in the past about a \"nerve pill\" but states she feels it is controlled at this time.        HEALTH RISK ASSESSMENT    : 1955    Recent Hospitalizations:  No hospitalization(s) within the last year..  ccc      Current Medical Providers:  Patient Care Team:  Nidhi Fisher APRN as PCP - General (Family Medicine)        Smoking Status:  Social History     Tobacco Use   Smoking Status Current Every Day Smoker   • Packs/day: 1.00   • Years: 50.00   • Pack years: 50.00   Smokeless Tobacco Never Used       Alcohol Consumption:  Social History     Substance and Sexual Activity   Alcohol Use No       Depression Screen:   PHQ-2/PHQ-9 Depression Screening 2022   Little interest or pleasure in doing things 0   Feeling down, depressed, or hopeless 0   Total Score 0       Health Habits and Functional and Cognitive Screening:  Functional & Cognitive Status 2022   Do you have difficulty preparing food and eating? No   Do you have difficulty bathing yourself, getting dressed or grooming yourself? No   Do you have difficulty using the toilet? No   Do you have difficulty moving around from place to place? No   Do you have trouble with steps or getting out of a bed or a chair? No   Current Diet Well Balanced Diet   Dental Exam Up to date   Eye " Exam Up to date   Exercise (times per week) 0 times per week   Current Exercises Include No Regular Exercise   Current Exercise Activities Include -   Do you need help using the phone?  No   Are you deaf or do you have serious difficulty hearing?  No   Do you need help with transportation? No   Do you need help shopping? No   Do you need help preparing meals?  No   Do you need help with housework?  No   Do you need help with laundry? No   Do you need help taking your medications? No   Do you need help managing money? No   Do you ever drive or ride in a car without wearing a seat belt? No   Have you felt unusual stress, anger or loneliness in the last month? No   Who do you live with? Spouse   If you need help, do you have trouble finding someone available to you? No   Have you been bothered in the last four weeks by sexual problems? No   Do you have difficulty concentrating, remembering or making decisions? No       Visual Acuity:  No exam data present    Does the patient have evidence of cognitive impairment? No    Asiprin use counseling: Taking ASA appropriately as indicated      Recent Lab Results:       Lab Results   Component Value Date    HGBA1C 6.20 (H) 01/21/2022     Lab Results   Component Value Date    TRIG 223 (H) 01/21/2022    HDL 39 (L) 01/21/2022    VLDL 40 01/21/2022           Age-appropriate Screening Schedule:  Refer to the list below for future screening recommendations based on patient's age, sex and/or medical conditions. Orders for these recommended tests are listed in the plan section. The patient has been provided with a written plan.    Health Maintenance   Topic Date Due   • URINE MICROALBUMIN  Never done   • DIABETIC FOOT EXAM  01/19/2022   • ZOSTER VACCINE (1 of 2) 02/09/2022 (Originally 2/13/2003)   • INFLUENZA VACCINE  02/09/2023 (Originally 8/1/2021)   • TDAP/TD VACCINES (1 - Tdap) 02/09/2023 (Originally 2/13/1974)   • HEMOGLOBIN A1C  07/21/2022   • DIABETIC EYE EXAM  10/15/2022   •  LIPID PANEL  01/21/2023   • MAMMOGRAM  03/05/2023   • DXA SCAN  03/05/2023        Subjective   History of Present Illness    Julia Diaz is a 66 y.o. female who presents for an Annual Wellness Visit.    The following portions of the patient's history were reviewed and updated as appropriate: allergies, current medications, past family history, past medical history, past social history, past surgical history and problem list.    Outpatient Medications Prior to Visit   Medication Sig Dispense Refill   • albuterol sulfate  (90 Base) MCG/ACT inhaler TAKE 2 PUFFS BY MOUTH EVERY 4 HOURS AS NEEDED FOR WHEEZE 18 g 2   • esomeprazole (nexIUM) 40 MG capsule TAKE ONE CAPSULE BY MOUTH EVERY MORNING BEFORE BREAKFAST 90 capsule 3   • ezetimibe (ZETIA) 10 MG tablet TAKE ONE TABLET BY MOUTH DAILY 90 tablet 3   • glucose blood test strip 1 daily 100 each 3   • glucose monitor monitoring kit 1 each Daily. 1 each 0   • Lancets misc 1 each Daily. 100 each 3   • loratadine (CLARITIN) 10 MG tablet Take  by mouth.     • losartan (COZAAR) 100 MG tablet Take 1 tablet by mouth Daily. 90 tablet 3   • Naproxen Sodium (ALEVE PO) Take  by mouth.       No facility-administered medications prior to visit.       Patient Active Problem List   Diagnosis   • Abscess, perirectal   • Anxiety   • Arthritis of knee   • Arthropathy, multiple sites   • Gastroesophageal reflux disease without esophagitis   • Hypercholesteremia   • Hypertension, benign   • Impaired fasting glucose   • Insomnia, controlled   • Tobacco use   • Vitamin D deficiency       Advance Care Planning:  ACP discussion was held with the patient during this visit. Patient does not have an advance directive, information provided.    Identification of Risk Factors:  Risk factors include: Advance Directive Discussion  Breast Cancer/Mammogram Screening  Cardiovascular risk  Chronic Pain   Colon Cancer Screening  Dementia/Memory   Depression/Dysphoria  Diabetic Lab Screening   Fall  Risk  Immunizations Discussed/Encouraged (specific immunizations; Tdap, Influenza, Pneumococcal 23, Shingrix and COVID19 )  Inadequate Social Support, Isolation, Loneliness, Lack of Transportation, Financial Difficulties, or Caregiver Stress   Inactivity/Sedentary  Lung Cancer Risk  Osteoporosis Risk.    Review of Systems   Constitutional: Negative for activity change, appetite change, fatigue, fever and unexpected weight change.   HENT: Negative for trouble swallowing and voice change.    Eyes: Negative for visual disturbance.   Respiratory: Negative for cough and shortness of breath.    Cardiovascular: Negative for chest pain, palpitations and leg swelling.   Gastrointestinal: Negative for abdominal pain, constipation, diarrhea, nausea and vomiting.   Endocrine: Negative for cold intolerance, heat intolerance, polydipsia and polyphagia.   Genitourinary: Negative for dysuria and frequency.   Musculoskeletal: Negative for arthralgias, back pain, joint swelling and neck pain.   Skin: Negative for color change and rash.   Neurological: Negative for dizziness and headaches.   Hematological: Does not bruise/bleed easily.   Psychiatric/Behavioral: Negative for agitation, hallucinations and suicidal ideas. The patient is nervous/anxious.        Compared to one year ago, the patient feels her physical health is the same.  Compared to one year ago, the patient feels her mental health is the same.    Objective     Physical Exam  Constitutional:       Appearance: She is well-developed.   HENT:      Head: Normocephalic.      Right Ear: Tympanic membrane and external ear normal.      Left Ear: Tympanic membrane and external ear normal.      Nose: Nose normal.      Mouth/Throat:      Mouth: Mucous membranes are moist. No oral lesions.      Pharynx: Oropharynx is clear.   Eyes:      Conjunctiva/sclera: Conjunctivae normal.      Pupils: Pupils are equal, round, and reactive to light.   Neck:      Thyroid: No thyromegaly.       "Vascular: No carotid bruit.   Cardiovascular:      Rate and Rhythm: Normal rate and regular rhythm.      Pulses: Normal pulses.           Radial pulses are 2+ on the right side and 2+ on the left side.      Heart sounds: Murmur heard.       Pulmonary:      Effort: Pulmonary effort is normal.      Breath sounds: Examination of the right-lower field reveals decreased breath sounds. Examination of the left-lower field reveals decreased breath sounds. Decreased breath sounds present.   Chest:   Breasts:      Right: Normal.      Left: Normal.       Abdominal:      General: Bowel sounds are normal.      Palpations: Abdomen is soft.      Tenderness: There is no abdominal tenderness.   Musculoskeletal:      Cervical back: Normal range of motion.      Right lower leg: No edema.      Left lower leg: No edema.   Feet:      Right foot:      Toenail Condition: Right toenails are ingrown.      Comments: Right great toenail turned inward; no swelling or drainage at this time.   Skin:     General: Skin is warm and dry.   Neurological:      Mental Status: She is alert and oriented to person, place, and time.      Gait: Gait normal.   Psychiatric:         Mood and Affect: Mood normal.         Speech: Speech normal.         Behavior: Behavior normal.         Thought Content: Thought content normal.         Vitals:    02/09/22 1001   BP: 150/100   BP Location: Left arm   Patient Position: Sitting   Cuff Size: Adult   Pulse: 81   Temp: 97.1 °F (36.2 °C)   TempSrc: Temporal   SpO2: 99%   Weight: 87.5 kg (193 lb)   Height: 158.8 cm (62.5\")   PainSc: 0-No pain       Patient's Body mass index is 34.74 kg/m². indicating that she is obese (BMI >30). Obesity-related health conditions include the following: hypertension and dyslipidemias. Obesity is unchanged. BMI is is above average; BMI management plan is completed. We discussed portion control and increasing exercise..      Procedure   Procedures       Assessment/Plan   Patient " Self-Management and Personalized Health Advice  The patient has been provided with information about: diet, exercise, tobacco cessation and mental health concerns.    Visit Diagnoses:    ICD-10-CM ICD-9-CM   1. Encounter for subsequent annual wellness visit (AWV) in Medicare patient  Z00.00 V70.0   2. Screening mammogram, encounter for  Z12.31 V76.12   3. Hypercholesteremia  E78.00 272.0   4. Hypertension, benign  I10 401.1   5. Impaired fasting glucose  R73.01 790.21   6. Osteopenia, unspecified location  M85.80 733.90   7. Hyperkalemia  E87.5 276.7   8. Heart murmur  R01.1 785.2   9. Ingrown toenail  L60.0 703.0       Orders Placed This Encounter   Procedures   • Mammo Screening Digital Tomosynthesis Bilateral With CAD     Standing Status:   Future     Standing Expiration Date:   2/9/2023     Order Specific Question:   Reason for Exam:     Answer:   screening mammogram   • Potassium     Order Specific Question:   Release to patient     Answer:   Immediate   • Ambulatory Referral to Podiatry     Referral Priority:   Routine     Referral Type:   Consultation     Referral Reason:   Specialty Services Required     Referred to Provider:   Zana Santos DPM     Requested Specialty:   Podiatry     Number of Visits Requested:   1   • Adult Transthoracic Echo Complete W/ Cont if Necessary Per Protocol     Standing Status:   Future     Standing Expiration Date:   2/9/2023     Order Specific Question:   Reason for exam?     Answer:   Murmur or Click     Order Specific Question:   Release to patient     Answer:   Immediate   • CBC & Differential       Outpatient Encounter Medications as of 2/9/2022   Medication Sig Dispense Refill   • albuterol sulfate  (90 Base) MCG/ACT inhaler TAKE 2 PUFFS BY MOUTH EVERY 4 HOURS AS NEEDED FOR WHEEZE 18 g 2   • esomeprazole (nexIUM) 40 MG capsule TAKE ONE CAPSULE BY MOUTH EVERY MORNING BEFORE BREAKFAST 90 capsule 3   • ezetimibe (ZETIA) 10 MG tablet TAKE ONE TABLET BY MOUTH DAILY  "90 tablet 3   • glucose blood test strip 1 daily 100 each 3   • glucose monitor monitoring kit 1 each Daily. 1 each 0   • Lancets misc 1 each Daily. 100 each 3   • loratadine (CLARITIN) 10 MG tablet Take  by mouth.     • losartan (COZAAR) 100 MG tablet Take 1 tablet by mouth Daily. 90 tablet 3   • Naproxen Sodium (ALEVE PO) Take  by mouth.     • Vitamin D, Cholecalciferol, 50 MCG (2000 UT) capsule Take 1 capsule by mouth Daily. 30 capsule 5     No facility-administered encounter medications on file as of 2/9/2022.       Reviewed use of high risk medication in the elderly: yes  Reviewed for potential of harmful drug interactions in the elderly: yes    Follow Up:  Return in about 6 months (around 8/9/2022) for Recheck.     An After Visit Summary and PPPS with all of these plans were given to the patient.           BP is elevated in the office today.  She states she had been seeing better readings than this at home but has not checked recently.  I've instructed she get a new machine and monitor at home.  If getting readings > 130/80 will need to notify the office and have BP medication adjusted.  I've also asked that she come to the office in about 2 weeks for a BP check.  She may eventually need increase in meds but will hold off due to her report of normal readings at home.   Cholesterol is elevated with low HDL.  We've discussed fish oil supplement and avoiding processed foods and added sugars.   A1C is 6.2 putting her in a pre-diabetes category.  Again, discussed avoiding sugar and processed foods.   For anxiety we discussed getting some \"her time\" and incorporating some light exercise.    Heart murmur heard in office today.  Will get ECHO at this time.  She denies any symptoms or exercise intolerance.   She has a significantly ingrown toenail.  I'm going to refer to podiatry as the nail will likely need to be removed.   Mammogram was ordered.  Dexa due next year.  We are going to start Vitamin D due to osteopenia " and level at 30.  Would prefer she be above 50.  She declines colonoscopy.  She denies updating vaccines today.    Continue performing self breast exams at home.

## 2022-02-10 DIAGNOSIS — I10 HYPERTENSION, BENIGN: Primary | ICD-10-CM

## 2022-02-10 DIAGNOSIS — I10 HYPERTENSION, BENIGN: ICD-10-CM

## 2022-02-10 LAB
BASOPHILS # BLD AUTO: 0.08 10*3/MM3 (ref 0–0.2)
BASOPHILS NFR BLD AUTO: 0.8 % (ref 0–1.5)
EOSINOPHIL # BLD AUTO: 0.1 10*3/MM3 (ref 0–0.4)
EOSINOPHIL NFR BLD AUTO: 1 % (ref 0.3–6.2)
ERYTHROCYTE [DISTWIDTH] IN BLOOD BY AUTOMATED COUNT: 13.3 % (ref 12.3–15.4)
HCT VFR BLD AUTO: 49.3 % (ref 34–46.6)
HGB BLD-MCNC: 16 G/DL (ref 12–15.9)
IMM GRANULOCYTES # BLD AUTO: 0.04 10*3/MM3 (ref 0–0.05)
IMM GRANULOCYTES NFR BLD AUTO: 0.4 % (ref 0–0.5)
LYMPHOCYTES # BLD AUTO: 3.43 10*3/MM3 (ref 0.7–3.1)
LYMPHOCYTES NFR BLD AUTO: 34.6 % (ref 19.6–45.3)
MCH RBC QN AUTO: 30.2 PG (ref 26.6–33)
MCHC RBC AUTO-ENTMCNC: 32.5 G/DL (ref 31.5–35.7)
MCV RBC AUTO: 93.2 FL (ref 79–97)
MONOCYTES # BLD AUTO: 0.53 10*3/MM3 (ref 0.1–0.9)
MONOCYTES NFR BLD AUTO: 5.3 % (ref 5–12)
NEUTROPHILS # BLD AUTO: 5.73 10*3/MM3 (ref 1.7–7)
NEUTROPHILS NFR BLD AUTO: 57.9 % (ref 42.7–76)
NRBC BLD AUTO-RTO: 0 /100 WBC (ref 0–0.2)
PLATELET # BLD AUTO: 355 10*3/MM3 (ref 140–450)
POTASSIUM SERPL-SCNC: 5.5 MMOL/L (ref 3.5–5.2)
RBC # BLD AUTO: 5.29 10*6/MM3 (ref 3.77–5.28)
WBC # BLD AUTO: 9.91 10*3/MM3 (ref 3.4–10.8)

## 2022-02-10 RX ORDER — AMLODIPINE BESYLATE 5 MG/1
5 TABLET ORAL DAILY
Qty: 901 TABLET | Refills: 1 | Status: SHIPPED | OUTPATIENT
Start: 2022-02-10 | End: 2022-02-10 | Stop reason: SDUPTHER

## 2022-02-10 RX ORDER — AMLODIPINE BESYLATE 5 MG/1
5 TABLET ORAL DAILY
Qty: 90 TABLET | Refills: 1 | Status: SHIPPED | OUTPATIENT
Start: 2022-02-10 | End: 2022-08-19 | Stop reason: SDUPTHER

## 2022-02-15 ENCOUNTER — HOSPITAL ENCOUNTER (OUTPATIENT)
Dept: CARDIOLOGY | Facility: HOSPITAL | Age: 67
Discharge: HOME | End: 2022-02-15
Admitting: NURSE PRACTITIONER

## 2022-02-15 DIAGNOSIS — R01.1 HEART MURMUR: ICD-10-CM

## 2022-02-15 PROCEDURE — 93306 TTE W/DOPPLER COMPLETE: CPT

## 2022-02-15 PROCEDURE — 93306 TTE W/DOPPLER COMPLETE: CPT | Performed by: EMERGENCY MEDICINE

## 2022-02-16 LAB
BH CV ECHO MEAS - ACS: 1.9 CM
BH CV ECHO MEAS - AO MAX PG (FULL): 6.7 MMHG
BH CV ECHO MEAS - AO MAX PG: 10 MMHG
BH CV ECHO MEAS - AO MEAN PG (FULL): 3 MMHG
BH CV ECHO MEAS - AO MEAN PG: 5 MMHG
BH CV ECHO MEAS - AO ROOT AREA (BSA CORRECTED): 1.4
BH CV ECHO MEAS - AO ROOT AREA: 5.7 CM^2
BH CV ECHO MEAS - AO ROOT DIAM: 2.7 CM
BH CV ECHO MEAS - AO V2 MAX: 158 CM/SEC
BH CV ECHO MEAS - AO V2 MEAN: 97.8 CM/SEC
BH CV ECHO MEAS - AO V2 VTI: 27.7 CM
BH CV ECHO MEAS - AVA(I,A): 3 CM^2
BH CV ECHO MEAS - AVA(I,D): 3 CM^2
BH CV ECHO MEAS - AVA(V,A): 2.2 CM^2
BH CV ECHO MEAS - AVA(V,D): 2.2 CM^2
BH CV ECHO MEAS - BSA(HAYCOCK): 2 M^2
BH CV ECHO MEAS - BSA: 1.9 M^2
BH CV ECHO MEAS - BZI_BMI: 35.3 KILOGRAMS/M^2
BH CV ECHO MEAS - BZI_METRIC_HEIGHT: 157.5 CM
BH CV ECHO MEAS - BZI_METRIC_WEIGHT: 87.5 KG
BH CV ECHO MEAS - EDV(CUBED): 135.8 ML
BH CV ECHO MEAS - EDV(MOD-SP2): 96.5 ML
BH CV ECHO MEAS - EDV(MOD-SP4): 89.7 ML
BH CV ECHO MEAS - EDV(TEICH): 126.1 ML
BH CV ECHO MEAS - EF(CUBED): 73.5 %
BH CV ECHO MEAS - EF(MOD-SP2): 57.4 %
BH CV ECHO MEAS - EF(MOD-SP4): 59.9 %
BH CV ECHO MEAS - EF(TEICH): 65 %
BH CV ECHO MEAS - ESV(CUBED): 35.9 ML
BH CV ECHO MEAS - ESV(MOD-SP2): 41.1 ML
BH CV ECHO MEAS - ESV(MOD-SP4): 36 ML
BH CV ECHO MEAS - ESV(TEICH): 44.1 ML
BH CV ECHO MEAS - FS: 35.8 %
BH CV ECHO MEAS - IVS/LVPW: 1.1
BH CV ECHO MEAS - IVSD: 0.95 CM
BH CV ECHO MEAS - LA DIMENSION: 3.1 CM
BH CV ECHO MEAS - LA/AO: 1.1
BH CV ECHO MEAS - LAT PEAK E' VEL: 7.5 CM/SEC
BH CV ECHO MEAS - LV DIASTOLIC VOL/BSA (35-75): 47.6 ML/M^2
BH CV ECHO MEAS - LV MASS(C)D: 170.2 GRAMS
BH CV ECHO MEAS - LV MASS(C)DI: 90.4 GRAMS/M^2
BH CV ECHO MEAS - LV MAX PG: 3.3 MMHG
BH CV ECHO MEAS - LV MEAN PG: 2 MMHG
BH CV ECHO MEAS - LV SYSTOLIC VOL/BSA (12-30): 19.1 ML/M^2
BH CV ECHO MEAS - LV V1 MAX: 90.2 CM/SEC
BH CV ECHO MEAS - LV V1 MEAN: 65.1 CM/SEC
BH CV ECHO MEAS - LV V1 VTI: 21.5 CM
BH CV ECHO MEAS - LVIDD: 5.1 CM
BH CV ECHO MEAS - LVIDS: 3.3 CM
BH CV ECHO MEAS - LVLD AP2: 7.3 CM
BH CV ECHO MEAS - LVLD AP4: 7 CM
BH CV ECHO MEAS - LVLS AP2: 5.7 CM
BH CV ECHO MEAS - LVLS AP4: 5.4 CM
BH CV ECHO MEAS - LVOT AREA (M): 3.8 CM^2
BH CV ECHO MEAS - LVOT AREA: 3.8 CM^2
BH CV ECHO MEAS - LVOT DIAM: 2.2 CM
BH CV ECHO MEAS - LVPWD: 0.89 CM
BH CV ECHO MEAS - MED PEAK E' VEL: 4.68 CM/SEC
BH CV ECHO MEAS - MV A MAX VEL: 79.1 CM/SEC
BH CV ECHO MEAS - MV DEC SLOPE: 363.5 CM/SEC^2
BH CV ECHO MEAS - MV DEC TIME: 0.17 SEC
BH CV ECHO MEAS - MV E MAX VEL: 60.6 CM/SEC
BH CV ECHO MEAS - MV E/A: 0.77
BH CV ECHO MEAS - MV MAX PG: 3.6 MMHG
BH CV ECHO MEAS - MV MEAN PG: 2 MMHG
BH CV ECHO MEAS - MV P1/2T MAX VEL: 76.2 CM/SEC
BH CV ECHO MEAS - MV P1/2T: 61.4 MSEC
BH CV ECHO MEAS - MV V2 MAX: 94.8 CM/SEC
BH CV ECHO MEAS - MV V2 MEAN: 63.1 CM/SEC
BH CV ECHO MEAS - MV V2 VTI: 18.2 CM
BH CV ECHO MEAS - MVA P1/2T LCG: 2.9 CM^2
BH CV ECHO MEAS - MVA(P1/2T): 3.6 CM^2
BH CV ECHO MEAS - MVA(VTI): 4.5 CM^2
BH CV ECHO MEAS - RAP SYSTOLE: 10 MMHG
BH CV ECHO MEAS - RVDD: 2.6 CM
BH CV ECHO MEAS - RVSP: 33.4 MMHG
BH CV ECHO MEAS - SI(AO): 84.2 ML/M^2
BH CV ECHO MEAS - SI(CUBED): 53 ML/M^2
BH CV ECHO MEAS - SI(LVOT): 43.4 ML/M^2
BH CV ECHO MEAS - SI(MOD-SP2): 29.4 ML/M^2
BH CV ECHO MEAS - SI(MOD-SP4): 28.5 ML/M^2
BH CV ECHO MEAS - SI(TEICH): 43.5 ML/M^2
BH CV ECHO MEAS - SV(AO): 158.6 ML
BH CV ECHO MEAS - SV(CUBED): 99.9 ML
BH CV ECHO MEAS - SV(LVOT): 81.7 ML
BH CV ECHO MEAS - SV(MOD-SP2): 55.4 ML
BH CV ECHO MEAS - SV(MOD-SP4): 53.7 ML
BH CV ECHO MEAS - SV(TEICH): 81.9 ML
BH CV ECHO MEAS - TR MAX VEL: 242 CM/SEC
BH CV ECHO MEASUREMENTS AVERAGE E/E' RATIO: 9.95
LEFT ATRIUM VOLUME INDEX: 23.6 ML/M2
LEFT ATRIUM VOLUME: 44.3 CM3
MAXIMAL PREDICTED HEART RATE: 151 BPM
STRESS TARGET HR: 128 BPM

## 2022-03-08 RX ORDER — EZETIMIBE 10 MG/1
10 TABLET ORAL DAILY
Qty: 90 TABLET | Refills: 1 | Status: SHIPPED | OUTPATIENT
Start: 2022-03-08 | End: 2022-09-23 | Stop reason: SDUPTHER

## 2022-03-18 ENCOUNTER — TELEPHONE (OUTPATIENT)
Dept: INTERNAL MEDICINE | Facility: CLINIC | Age: 67
End: 2022-03-18

## 2022-03-18 DIAGNOSIS — Z12.31 SCREENING MAMMOGRAM, ENCOUNTER FOR: ICD-10-CM

## 2022-03-18 NOTE — PROGRESS NOTES
[facility] - PODIATRY    Today's Date: 22    Patient Name: Julia Diaz  MRN: [id number]  CSN: [id number]  PCP: Nidhi Fisher APRN  Referring Provider: Nidhi Fisher APRN    SUBJECTIVE     Chief Complaint   Patient presents with   • Establish Care     Nidhi Fisher APRN 2022 NEW PATIENT  -DX Ingrown toenail - NO IMAGING - PN 22 - ARRIVE EARLY - pt states right hallux has always been ingrown toenail, and is very painful - pt denies pain, when having that pain 10/10 - pt presents possible ingrown toenail right hallux      HPI: Julia Diaz, a 67 y.o.female, comes to clinic as a(n) new patient complaining of ingrown toenail and complaining of painful toenails. Patient has h/o GERD, arthritis, hyperlipidemia, sinusitis. Patient presents with complaints of ingrowing toenail and nail dystrophy of the right hallux nail.  States that for most of her life she has had issues with the way that the hallux nail grows.  She states that at least annually she has to digging into nail border on her own to remove offending section of nail.  Denies any redness, drainage, or other signs of infection.  States that pain has been worse over the last 6 to 8 months and that she has been unable to alleviate symptoms. Admits pain at 10/10 level and described as sharp. Relates previous treatment(s) including Self-care of nails. Denies any constitutional symptoms. No other pedal complaints at this time.    Past Medical History:   Diagnosis Date   • Acid reflux    • Allergic    • Arthritis    • Elevated cholesterol    • Sinusitis      Past Surgical History:   Procedure Laterality Date   • CARPAL TUNNEL RELEASE     •  SECTION     • HYSTERECTOMY     • INNER EAR SURGERY     • REPLACEMENT TOTAL KNEE       Family History   Problem Relation Age of Onset   • Obesity Mother    • Cancer Father    • Heart disease Father    • Obesity Father      Social History     Socioeconomic History   • Marital status:     Tobacco Use   • Smoking status: Current Every Day Smoker     Packs/day: 1.00     Years: 50.00     Pack years: 50.00   • Smokeless tobacco: Never Used   Substance and Sexual Activity   • Alcohol use: No   • Drug use: No   • Sexual activity: Not Currently     Partners: Male     Birth control/protection: Surgical     No Known Allergies  Current Outpatient Medications   Medication Sig Dispense Refill   • albuterol sulfate  (90 Base) MCG/ACT inhaler TAKE 2 PUFFS BY MOUTH EVERY 4 HOURS AS NEEDED FOR WHEEZE 18 g 2   • amLODIPine (NORVASC) 5 MG tablet Take 1 tablet by mouth Daily. 90 tablet 1   • esomeprazole (nexIUM) 40 MG capsule TAKE ONE CAPSULE BY MOUTH EVERY MORNING BEFORE BREAKFAST 90 capsule 3   • ezetimibe (ZETIA) 10 MG tablet Take 1 tablet by mouth Daily. 90 tablet 1   • glucose blood test strip 1 daily 100 each 3   • glucose monitor monitoring kit 1 each Daily. 1 each 0   • Lancets misc 1 each Daily. 100 each 3   • loratadine (CLARITIN) 10 MG tablet Take  by mouth.     • Naproxen Sodium (ALEVE PO) Take  by mouth.     • Vitamin D, Cholecalciferol, 50 MCG (2000 UT) capsule Take 1 capsule by mouth Daily. 30 capsule 5     No current facility-administered medications for this visit.     Review of Systems   Constitutional: Negative for chills and fever.   HENT: Negative for congestion.    Respiratory: Negative for shortness of breath.    Cardiovascular: Negative for chest pain and leg swelling.   Gastrointestinal: Negative for constipation, diarrhea, nausea and vomiting.   Musculoskeletal: Positive for arthralgias. Negative for myalgias.   Skin: Negative for wound.        IGTN   Neurological: Negative for numbness.       OBJECTIVE     Vitals:    03/21/22 1450   BP: 118/80   Pulse: 98   SpO2: 95%       PHYSICAL EXAM  GEN:   Accompanied by spouse.     Foot/Ankle Exam:       General:   Appearance: appears stated age and healthy    Orientation: AAOx3    Affect: appropriate    Gait: unimpaired    Assistance:  independent    Shoe Gear:  Sandals    VASCULAR      Right Foot Vascularity   Dorsalis pedis:  2+  Posterior tibial:  2+  Skin Temperature: warm    Edema Grading:  None  CFT:  3  Pedal Hair Growth:  Present  Varicosities: none       Left Foot Vascularity   Dorsalis pedis:  2+  Posterior tibial:  2+  Skin Temperature: warm    Edema Grading:  None  CFT:  3  Pedal Hair Growth:  Present  Varicosities: none        NEUROLOGIC     Right Foot Neurologic   Normal sensation    Light touch sensation:  Normal  Vibratory sensation:  Normal  Hot/Cold sensation: normal    Protective Sensation using [place] Monofilament:  10     Left Foot Neurologic   Normal sensation    Light touch sensation:  Normal  Vibratory sensation:  Normal  Hot/cold sensation: normal    Protective Sensation using [place] Monofilament:  10     MUSCULOSKELETAL      Right Foot Musculoskeletal   Ecchymosis:  None  Tenderness: toenails and toe 1    Arch:  Normal     Left Foot Musculoskeletal   Ecchymosis:  None  Tenderness: none    Arch:  Normal     MUSCLE STRENGTH     Right Foot Muscle Strength   Foot dorsiflexion:  5  Foot plantar flexion:  5  Foot inversion:  5  Foot eversion:  5     Left Foot Muscle Strength   Foot dorsiflexion:  5  Foot plantar flexion:  5  Foot inversion:  5  Foot eversion:  5     RANGE OF MOTION      Right Foot Range of Motion   Foot and ankle ROM within normal limits       Left Foot Range of Motion   Foot and ankle ROM within normal limits       DERMATOLOGIC     Right Foot Dermatologic   Skin: skin intact    Nails: abnormally thick, dystrophic nails and ingrown toenail    Nails comment:  Hallux     Left Foot Dermatologic   Skin: skin intact    Nails: abnormally thick    Nails comment:  Hallux      RADIOLOGY/NUCLEAR:  No results found.    LABORATORY/CULTURE RESULTS:      PATHOLOGY RESULTS:       ASSESSMENT/PLAN     Diagnoses and all orders for this visit:    1. Ingrown toenail (Primary)    2. Nail dystrophy    3. Toe  pain, right      Comprehensive lower extremity examination and evaluation was performed.  Discussed findings and treatment plan including risks, benefits, and treatment options with patient in detail. Patient agreed with treatment plan.  Discussed treatment options with patient including debridement of dystrophic section of nail versus partial nail avulsion with matrixectomy to remove offending nail section and prevent future recurrence.  At this time patient wishes to have debridement of the offending border and may consider nail avulsion in the future if symptoms fail to improve.  After verbal consent obtained, nail(s) x1 debrided of offending borders with nail nipper without incidence  Patient may maintain nails and calluses at home utilizing emery board or pumice stone between visits as needed   An After Visit Summary was printed and given to the patient at discharge, including (if requested) any available informative/educational handouts regarding diagnosis, treatment, or medications. All questions were answered to patient/family satisfaction. Should symptoms fail to improve or worsen they agree to call or return to clinic or to go to the Emergency Department. Discussed the importance of following up with any needed screening tests/labs/specialist appointments and any requested follow-up recommended by me today. Importance of maintaining follow-up discussed and patient accepts that missed appointments can delay diagnosis and potentially lead to worsening of conditions.  Return if symptoms worsen or fail to improve., or sooner if acute issues arise.    Lab Frequency Next Occurrence   XR Chest PA & Lateral Once 02/04/2022   Mammo Screening Digital Tomosynthesis Bilateral With CAD Once 02/09/2023       This document has been electronically signed by BELEM Campoverde on March 21, 2022 15:23 CDT

## 2022-03-18 NOTE — TELEPHONE ENCOUNTER
----- Message from BELEM Nix sent at 3/18/2022  1:00 PM CDT -----  Negative Mammogram- repeat in 1 year

## 2022-03-21 ENCOUNTER — OFFICE VISIT (OUTPATIENT)
Dept: PODIATRY | Facility: CLINIC | Age: 67
End: 2022-03-21

## 2022-03-21 VITALS
HEIGHT: 63 IN | SYSTOLIC BLOOD PRESSURE: 118 MMHG | HEART RATE: 98 BPM | DIASTOLIC BLOOD PRESSURE: 80 MMHG | WEIGHT: 194.6 LBS | BODY MASS INDEX: 34.48 KG/M2 | OXYGEN SATURATION: 95 %

## 2022-03-21 DIAGNOSIS — L60.3 NAIL DYSTROPHY: ICD-10-CM

## 2022-03-21 DIAGNOSIS — M79.674 TOE PAIN, RIGHT: ICD-10-CM

## 2022-03-21 DIAGNOSIS — L60.0 INGROWN TOENAIL: Primary | ICD-10-CM

## 2022-03-21 PROCEDURE — 99213 OFFICE O/P EST LOW 20 MIN: CPT | Performed by: NURSE PRACTITIONER

## 2022-03-21 PROCEDURE — 11720 DEBRIDE NAIL 1-5: CPT | Performed by: NURSE PRACTITIONER

## 2022-06-13 RX ORDER — ESOMEPRAZOLE MAGNESIUM 40 MG/1
40 CAPSULE, DELAYED RELEASE ORAL
Qty: 90 CAPSULE | Refills: 0 | Status: SHIPPED | OUTPATIENT
Start: 2022-06-13 | End: 2022-09-12

## 2022-08-08 ENCOUNTER — NURSE TRIAGE (OUTPATIENT)
Dept: CALL CENTER | Facility: HOSPITAL | Age: 67
End: 2022-08-08

## 2022-08-08 ENCOUNTER — HOSPITAL ENCOUNTER (EMERGENCY)
Facility: HOSPITAL | Age: 67
Discharge: HOME | End: 2022-08-08
Admitting: EMERGENCY MEDICINE

## 2022-08-08 VITALS
RESPIRATION RATE: 20 BRPM | OXYGEN SATURATION: 98 % | SYSTOLIC BLOOD PRESSURE: 146 MMHG | TEMPERATURE: 97.6 F | WEIGHT: 190 LBS | HEIGHT: 62 IN | DIASTOLIC BLOOD PRESSURE: 72 MMHG | HEART RATE: 85 BPM | BODY MASS INDEX: 34.96 KG/M2

## 2022-08-08 DIAGNOSIS — R89.9 ABNORMAL LABORATORY TEST: Primary | ICD-10-CM

## 2022-08-08 LAB
ALBUMIN SERPL-MCNC: 4.2 G/DL (ref 3.5–5.2)
ALBUMIN/GLOB SERPL: 1.7 G/DL
ALP SERPL-CCNC: 94 U/L (ref 39–117)
ALT SERPL W P-5'-P-CCNC: 18 U/L (ref 1–33)
ANION GAP SERPL CALCULATED.3IONS-SCNC: 13 MMOL/L (ref 5–15)
AST SERPL-CCNC: 20 U/L (ref 1–32)
BASOPHILS # BLD MANUAL: 0.15 10*3/MM3 (ref 0–0.2)
BASOPHILS NFR BLD MANUAL: 1.1 % (ref 0–1.5)
BILIRUB SERPL-MCNC: <0.2 MG/DL (ref 0–1.2)
BUN SERPL-MCNC: 13 MG/DL (ref 8–23)
BUN/CREAT SERPL: 14.8 (ref 7–25)
CALCIUM SPEC-SCNC: 9.4 MG/DL (ref 8.6–10.5)
CHLORIDE SERPL-SCNC: 104 MMOL/L (ref 98–107)
CO2 SERPL-SCNC: 24 MMOL/L (ref 22–29)
CREAT SERPL-MCNC: 0.88 MG/DL (ref 0.57–1)
DEPRECATED RDW RBC AUTO: 47.3 FL (ref 37–54)
EGFRCR SERPLBLD CKD-EPI 2021: 71.2 ML/MIN/1.73
ERYTHROCYTE [DISTWIDTH] IN BLOOD BY AUTOMATED COUNT: 14.5 % (ref 12.3–15.4)
GLOBULIN UR ELPH-MCNC: 2.5 GM/DL
GLUCOSE SERPL-MCNC: 126 MG/DL (ref 65–99)
HCT VFR BLD AUTO: 46.7 % (ref 34–46.6)
HGB BLD-MCNC: 15.9 G/DL (ref 12–15.9)
HOLD SPECIMEN: NORMAL
HOLD SPECIMEN: NORMAL
INR PPP: 0.92 (ref 0.91–1.09)
LYMPHOCYTES # BLD MANUAL: 4.05 10*3/MM3 (ref 0.7–3.1)
LYMPHOCYTES NFR BLD MANUAL: 3.2 % (ref 5–12)
MAGNESIUM SERPL-MCNC: 1.8 MG/DL (ref 1.6–2.4)
MCH RBC QN AUTO: 30.5 PG (ref 26.6–33)
MCHC RBC AUTO-ENTMCNC: 34 G/DL (ref 31.5–35.7)
MCV RBC AUTO: 89.6 FL (ref 79–97)
METAMYELOCYTES NFR BLD MANUAL: 1.1 % (ref 0–0)
MONOCYTES # BLD: 0.44 10*3/MM3 (ref 0.1–0.9)
NEUTROPHILS # BLD AUTO: 8.97 10*3/MM3 (ref 1.7–7)
NEUTROPHILS NFR BLD MANUAL: 63.2 % (ref 42.7–76)
NEUTS BAND NFR BLD MANUAL: 2.1 % (ref 0–5)
PLAT MORPH BLD: NORMAL
PLATELET # BLD AUTO: 337 10*3/MM3 (ref 140–450)
PMV BLD AUTO: 10.7 FL (ref 6–12)
POTASSIUM SERPL-SCNC: 4 MMOL/L (ref 3.5–5.2)
PROT SERPL-MCNC: 6.7 G/DL (ref 6–8.5)
PROTHROMBIN TIME: 12 SECONDS (ref 11.9–14.6)
RBC # BLD AUTO: 5.21 10*6/MM3 (ref 3.77–5.28)
RBC MORPH BLD: NORMAL
SODIUM SERPL-SCNC: 141 MMOL/L (ref 136–145)
TROPONIN T SERPL-MCNC: <0.01 NG/ML (ref 0–0.03)
VARIANT LYMPHS NFR BLD MANUAL: 24.2 % (ref 19.6–45.3)
VARIANT LYMPHS NFR BLD MANUAL: 5.3 % (ref 0–5)
WBC MORPH BLD: NORMAL
WBC NRBC COR # BLD: 13.74 10*3/MM3 (ref 3.4–10.8)
WHOLE BLOOD HOLD COAG: NORMAL
WHOLE BLOOD HOLD SPECIMEN: NORMAL

## 2022-08-08 PROCEDURE — 84484 ASSAY OF TROPONIN QUANT: CPT | Performed by: NURSE PRACTITIONER

## 2022-08-08 PROCEDURE — 85025 COMPLETE CBC W/AUTO DIFF WBC: CPT | Performed by: NURSE PRACTITIONER

## 2022-08-08 PROCEDURE — 99283 EMERGENCY DEPT VISIT LOW MDM: CPT

## 2022-08-08 PROCEDURE — 93010 ELECTROCARDIOGRAM REPORT: CPT | Performed by: INTERNAL MEDICINE

## 2022-08-08 PROCEDURE — 93005 ELECTROCARDIOGRAM TRACING: CPT | Performed by: EMERGENCY MEDICINE

## 2022-08-08 PROCEDURE — 83735 ASSAY OF MAGNESIUM: CPT | Performed by: NURSE PRACTITIONER

## 2022-08-08 PROCEDURE — 80053 COMPREHEN METABOLIC PANEL: CPT | Performed by: NURSE PRACTITIONER

## 2022-08-08 PROCEDURE — 85610 PROTHROMBIN TIME: CPT | Performed by: NURSE PRACTITIONER

## 2022-08-08 PROCEDURE — 85007 BL SMEAR W/DIFF WBC COUNT: CPT | Performed by: NURSE PRACTITIONER

## 2022-08-08 RX ORDER — SODIUM CHLORIDE 0.9 % (FLUSH) 0.9 %
10 SYRINGE (ML) INJECTION AS NEEDED
Status: DISCONTINUED | OUTPATIENT
Start: 2022-08-08 | End: 2022-08-09 | Stop reason: HOSPADM

## 2022-08-08 RX ADMIN — SODIUM CHLORIDE 1000 ML: 9 INJECTION, SOLUTION INTRAVENOUS at 22:32

## 2022-08-09 LAB
QT INTERVAL: 378 MS
QTC INTERVAL: 464 MS

## 2022-08-09 NOTE — TELEPHONE ENCOUNTER
Call placed to Nicky Heredia NP Advised her that the pt K+ is 6.3   Nicky asked that I call MS Diaz and tell her to go to the ER and have it treated tonight.  I spoke with MS Diaz and advised her to seek treatment in the ER

## 2022-08-09 NOTE — ED PROVIDER NOTES
Subjective   Patient is a 67-year-old white female presents emergency department stating that she was called by the hospital lab and advised that her potassium was high.  She states she had her labs drawn around 830 this morning for her doctor's appointment with Dr. Degroot in 2 days.  She was not having any problems.  She was just getting routine labs and the lab called her and advised her of the elevated potassium and advised her she needed to come the emergency department for further evaluation and treatment.  Patient denies any chest pain or shortness of breath.  She states she has had a recent medication change and has started Norvasc about 6 months ago for hypertension.  She denies any other medication changes.  She states that she feels fine otherwise.      History provided by:  Patient   used: No        Review of Systems   Constitutional: Negative.    HENT: Negative.    Eyes: Negative.    Respiratory: Negative.    Cardiovascular:        Patient is a 67-year-old white female presents emergency department stating that she was called by the hospital lab and advised that her potassium was high.  She states she had her labs drawn around 830 this morning for her doctor's appointment with Dr. Degroot in 2 days.  She was not having any problems.  She was just getting routine labs and the lab called her and advised her of the elevated potassium and advised her she needed to come the emergency department for further evaluation and treatment.  Patient denies any chest pain or shortness of breath.  She states she has had a recent medication change and has started Norvasc about 6 months ago for hypertension.  She denies any other medication changes.  She states that she feels fine otherwise.     Gastrointestinal: Negative.    Endocrine: Negative.    Genitourinary: Negative.    Musculoskeletal: Negative.    Skin: Negative.    Allergic/Immunologic: Negative.    Neurological: Negative.    Hematological:  Negative.    Psychiatric/Behavioral: Negative.    All other systems reviewed and are negative.      Past Medical History:   Diagnosis Date   • Acid reflux    • Allergic    • Arthritis    • Elevated cholesterol    • Sinusitis        No Known Allergies    Past Surgical History:   Procedure Laterality Date   • CARPAL TUNNEL RELEASE     •  SECTION     • HYSTERECTOMY     • INNER EAR SURGERY     • REPLACEMENT TOTAL KNEE         Family History   Problem Relation Age of Onset   • Obesity Mother    • Cancer Father    • Heart disease Father    • Obesity Father        Social History     Socioeconomic History   • Marital status:    Tobacco Use   • Smoking status: Current Every Day Smoker     Packs/day: 1.00     Years: 50.00     Pack years: 50.00   • Smokeless tobacco: Never Used   Substance and Sexual Activity   • Alcohol use: No   • Drug use: No   • Sexual activity: Not Currently     Partners: Male     Birth control/protection: Surgical       Prior to Admission medications    Medication Sig Start Date End Date Taking? Authorizing Provider   albuterol sulfate  (90 Base) MCG/ACT inhaler TAKE 2 PUFFS BY MOUTH EVERY 4 HOURS AS NEEDED FOR WHEEZE 22   Reyes Damon MD   amLODIPine (NORVASC) 5 MG tablet Take 1 tablet by mouth Daily. 2/10/22   Nidhi Fisher APRN   esomeprazole (nexIUM) 40 MG capsule Take 1 capsule by mouth Every Morning Before Breakfast. 22   Caroline Degroot DO   ezetimibe (ZETIA) 10 MG tablet Take 1 tablet by mouth Daily. 3/8/22   Nidhi Fisher APRN   glucose blood test strip 1 daily 20   Reyes Damon MD   glucose monitor monitoring kit 1 each Daily. 20   Reyes Damon MD   Lancets misc 1 each Daily. 20   Reyes Damon MD   loratadine (CLARITIN) 10 MG tablet Take  by mouth.    Provider, MD Abril   Naproxen Sodium (ALEVE PO) Take  by mouth.    Provider, MD Abril   Vitamin D, Cholecalciferol, 50 MCG (2000 UT) capsule Take 1 capsule by  "mouth Daily. 2/9/22   Nidhi Fisher APRN       /72   Pulse 85   Temp 97.6 °F (36.4 °C) (Oral)   Resp 20   Ht 157.5 cm (62\")   Wt 86.2 kg (190 lb)   SpO2 98%   BMI 34.75 kg/m²     Objective   Physical Exam  Vitals and nursing note reviewed.   Constitutional:       Appearance: She is well-developed.      Comments: Nontoxic-appearing no acute distress   HENT:      Head: Normocephalic and atraumatic.   Eyes:      Conjunctiva/sclera: Conjunctivae normal.      Pupils: Pupils are equal, round, and reactive to light.   Neck:      Thyroid: No thyromegaly.      Trachea: No tracheal deviation.   Cardiovascular:      Rate and Rhythm: Normal rate and regular rhythm.      Heart sounds: Normal heart sounds.   Pulmonary:      Effort: Pulmonary effort is normal. No respiratory distress.      Breath sounds: Normal breath sounds. No wheezing or rales.   Chest:      Chest wall: No tenderness.   Abdominal:      General: Bowel sounds are normal.      Palpations: Abdomen is soft.   Musculoskeletal:         General: Normal range of motion.      Cervical back: Normal range of motion and neck supple.   Skin:     General: Skin is warm and dry.   Neurological:      Mental Status: She is alert and oriented to person, place, and time.      Cranial Nerves: No cranial nerve deficit.      Deep Tendon Reflexes: Reflexes are normal and symmetric.   Psychiatric:         Behavior: Behavior normal.         Thought Content: Thought content normal.         Judgment: Judgment normal.         Procedures         Lab Results (last 24 hours)     Procedure Component Value Units Date/Time    CBC & Differential [[id number]]  (Abnormal) Collected: 08/08/22 2231    Specimen: Blood Updated: 08/08/22 2306    Narrative:      The following orders were created for panel order CBC & Differential.  Procedure                               Abnormality         Status                     ---------                               -----------         ------           "           CBC Auto Differential[[id number]]        Abnormal            Final result                 Please view results for these tests on the individual orders.    Comprehensive Metabolic Panel [[id number]]  (Abnormal) Collected: 08/08/22 2231    Specimen: Blood Updated: 08/08/22 2300     Glucose 126 mg/dL      BUN 13 mg/dL      Creatinine 0.88 mg/dL      Sodium 141 mmol/L      Potassium 4.0 mmol/L      Comment: Specimen hemolyzed.  Results may be affected.        Chloride 104 mmol/L      CO2 24.0 mmol/L      Calcium 9.4 mg/dL      Total Protein 6.7 g/dL      Albumin 4.20 g/dL      ALT (SGPT) 18 U/L      Comment: Specimen hemolyzed.  Results may be affected.        AST (SGOT) 20 U/L      Comment: Specimen hemolyzed.  Results may be affected.        Alkaline Phosphatase 94 U/L      Total Bilirubin <0.2 mg/dL      Globulin 2.5 gm/dL      A/G Ratio 1.7 g/dL      BUN/Creatinine Ratio 14.8     Anion Gap 13.0 mmol/L      eGFR 71.2 mL/min/1.73      Comment: National Kidney Foundation and American Society of Nephrology (ASN) Task Force recommended calculation based on the Chronic Kidney Disease Epidemiology Collaboration (CKD-EPI) equation refit without adjustment for race.       Narrative:      GFR Normal >60  Chronic Kidney Disease <60  Kidney Failure <15      Protime-INR [[id number]]  (Normal) Collected: 08/08/22 2231    Specimen: Blood Updated: 08/08/22 2250     Protime 12.0 Seconds      INR 0.92    Troponin [[id number]]  (Normal) Collected: 08/08/22 2231    Specimen: Blood Updated: 08/08/22 2258     Troponin T <0.010 ng/mL     Narrative:      Troponin T Reference Range:  <= 0.03 ng/mL-   Negative for AMI  >0.03 ng/mL-     Abnormal for myocardial necrosis.  Clinicians would have to utilize clinical acumen, EKG, Troponin and serial changes to determine if it is an Acute Myocardial Infarction or myocardial injury due to an underlying chronic condition.       Results may be falsely decreased if patient taking Biotin.       Magnesium [[id number]]  (Normal) Collected: 08/08/22 2231    Specimen: Blood Updated: 08/08/22 2254     Magnesium 1.8 mg/dL     CBC Auto Differential [[id number]]  (Abnormal) Collected: 08/08/22 2231    Specimen: Blood Updated: 08/08/22 2306     WBC 13.74 10*3/mm3      RBC 5.21 10*6/mm3      Hemoglobin 15.9 g/dL      Hematocrit 46.7 %      MCV 89.6 fL      MCH 30.5 pg      MCHC 34.0 g/dL      RDW 14.5 %      RDW-SD 47.3 fl      MPV 10.7 fL      Platelets 337 10*3/mm3     Narrative:      The previously reported component NRBC is no longer being reported. Previous result was 0.0 /100 WBC (Reference Range: 0.0-0.2 /100 WBC) on 8/8/2022 at 2246 CDT.    Manual Differential [[id number]]  (Abnormal) Collected: 08/08/22 2231    Specimen: Blood Updated: 08/08/22 2306     Neutrophil % 63.2 %      Lymphocyte % 24.2 %      Monocyte % 3.2 %      Basophil % 1.1 %      Bands %  2.1 %      Metamyelocyte % 1.1 %      Atypical Lymphocyte % 5.3 %      Neutrophils Absolute 8.97 10*3/mm3      Lymphocytes Absolute 4.05 10*3/mm3      Monocytes Absolute 0.44 10*3/mm3      Basophils Absolute 0.15 10*3/mm3      RBC Morphology Normal     WBC Morphology Normal     Platelet Morphology Normal          No orders to display       ED Course  ED Course as of 08/09/22 0042   Mon Aug 08, 2022   2313 K+ is 4.0. other labs are wnl. Advised the pt not sure why the earlier K+ was elevated. Feel comfortable sending this pt home to follow up with Dr. Degroot as scheduled this week. She states that she is feeling well and ready to go home. Will be discharged shortly in stable condition  [CW]      ED Course User Index  [CW] Lisa Lugo, BELEM          MDM  Number of Diagnoses or Management Options  Abnormal laboratory test: minor     Amount and/or Complexity of Data Reviewed  Clinical lab tests: ordered and reviewed  Tests in the radiology section of CPT®: ordered and reviewed    Patient Progress  Patient progress: stable      Final diagnoses:   Abnormal  laboratory test          Lisa Lugo, APRN  08/09/22 0042

## 2022-08-10 ENCOUNTER — OFFICE VISIT (OUTPATIENT)
Dept: INTERNAL MEDICINE | Facility: CLINIC | Age: 67
End: 2022-08-10

## 2022-08-10 VITALS
HEART RATE: 85 BPM | OXYGEN SATURATION: 99 % | DIASTOLIC BLOOD PRESSURE: 78 MMHG | TEMPERATURE: 97.5 F | WEIGHT: 194 LBS | HEIGHT: 62 IN | SYSTOLIC BLOOD PRESSURE: 150 MMHG | BODY MASS INDEX: 35.7 KG/M2

## 2022-08-10 DIAGNOSIS — E78.00 HYPERCHOLESTEREMIA: Primary | ICD-10-CM

## 2022-08-10 DIAGNOSIS — E66.01 CLASS 2 SEVERE OBESITY DUE TO EXCESS CALORIES WITH SERIOUS COMORBIDITY AND BODY MASS INDEX (BMI) OF 35.0 TO 35.9 IN ADULT: ICD-10-CM

## 2022-08-10 DIAGNOSIS — I10 HYPERTENSION, BENIGN: ICD-10-CM

## 2022-08-10 DIAGNOSIS — R73.03 PREDIABETES: ICD-10-CM

## 2022-08-10 PROBLEM — E66.812 CLASS 2 SEVERE OBESITY DUE TO EXCESS CALORIES WITH SERIOUS COMORBIDITY AND BODY MASS INDEX (BMI) OF 35.0 TO 35.9 IN ADULT: Status: ACTIVE | Noted: 2022-08-10

## 2022-08-10 PROCEDURE — 99214 OFFICE O/P EST MOD 30 MIN: CPT | Performed by: NURSE PRACTITIONER

## 2022-08-10 RX ORDER — ASPIRIN 81 MG/1
81 TABLET, CHEWABLE ORAL DAILY
COMMUNITY

## 2022-08-10 NOTE — PROGRESS NOTES
Subjective     Chief Complaint:  Hypertension, hyperlipidemia    HPI:  Patient presents to the office today for a 6-month follow-up regarding hypertension, hyperlipidemia.  On her labs that were drawn prior to her appointment, it was noted that her potassium was 6.3, at which point she was directed to the emergency department for treatment.  Upon reassessment, the patient's potassium was 4.0 so original specimen was likely hemolyzed.    Reviewing patient's labs, her hemoglobin A1c has increased from 6.2 in January to 6.3%.  Her total cholesterol and LDL cholesterol have worsened.  She is maintained on Zetia therapy only, states she was intolerant to statins due to muscle aches.  She does report that she has been eating very poorly at home and has not been exercising, but does want to try to lose weight.    The patient's blood pressure in the office today is 150/78.  She states she does get nervous coming into the office and her blood pressure generally runs higher here than at home.  She does watch her blood pressure closely at home and states it averages around 125/70.  She feels that amlodipine is working much better for her blood pressure than previous medication Cozaar.    Patient's PMR from outside medical facility reviewed and noted.    Past Medical History:   Past Medical History:   Diagnosis Date   • Acid reflux    • Allergic    • Arthritis    • Elevated cholesterol    • Sinusitis      Past Surgical History:  Past Surgical History:   Procedure Laterality Date   • CARPAL TUNNEL RELEASE     •  SECTION     • HYSTERECTOMY     • INNER EAR SURGERY     • REPLACEMENT TOTAL KNEE       Social History:  reports that she has been smoking cigarettes. She has a 50.00 pack-year smoking history. She has never used smokeless tobacco. She reports that she does not drink alcohol and does not use drugs.    Family History: family history includes Cancer in her father; Heart disease in her father; Obesity in her  "father and mother.      Allergies:  No Known Allergies  Medications:  Prior to Admission medications    Medication Sig Start Date End Date Taking? Authorizing Provider   albuterol sulfate  (90 Base) MCG/ACT inhaler TAKE 2 PUFFS BY MOUTH EVERY 4 HOURS AS NEEDED FOR WHEEZE 1/20/22  Yes Reyes Damon MD   amLODIPine (NORVASC) 5 MG tablet Take 1 tablet by mouth Daily. 2/10/22  Yes Nidhi Fisher APRN   aspirin 81 MG chewable tablet Chew 81 mg Daily.   Yes Abril Walsh MD   esomeprazole (nexIUM) 40 MG capsule Take 1 capsule by mouth Every Morning Before Breakfast. 6/13/22  Yes Caroline Degroot DO   ezetimibe (ZETIA) 10 MG tablet Take 1 tablet by mouth Daily. 3/8/22  Yes Nidhi Fisher APRN   glucose blood test strip 1 daily 7/16/20  Yes Reyes Damon MD   glucose monitor monitoring kit 1 each Daily. 7/16/20  Yes Reyes Damon MD   Lancets misc 1 each Daily. 7/16/20  Yes Reyes Damon MD   loratadine (CLARITIN) 10 MG tablet Take  by mouth.   Yes ProviderAbril MD   Naproxen Sodium (ALEVE PO) Take  by mouth.   Yes Abril Walsh MD   Vitamin D, Cholecalciferol, 50 MCG (2000 UT) capsule Take 1 capsule by mouth Daily. 2/9/22  Yes Nidhi Fisher APRN       Objective     Vital Signs: /78 (BP Location: Left arm, Patient Position: Sitting, Cuff Size: Adult)   Pulse 85   Temp 97.5 °F (36.4 °C)   Ht 157.5 cm (62\")   Wt 88 kg (194 lb)   SpO2 99%   BMI 35.48 kg/m²   Physical Exam  Vitals and nursing note reviewed.   Constitutional:       General: She is not in acute distress.     Appearance: She is obese. She is not ill-appearing or toxic-appearing.   HENT:      Head: Normocephalic and atraumatic.      Mouth/Throat:      Mouth: Mucous membranes are moist.      Pharynx: Oropharynx is clear.   Cardiovascular:      Rate and Rhythm: Normal rate and regular rhythm.      Pulses: Normal pulses.      Heart sounds: Murmur heard.   Pulmonary:      Effort: Pulmonary effort is " normal.      Breath sounds: No wheezing, rhonchi or rales.   Abdominal:      General: Bowel sounds are normal. There is no distension.      Palpations: Abdomen is soft.      Tenderness: There is no abdominal tenderness.   Musculoskeletal:         General: No swelling or tenderness. Normal range of motion.      Cervical back: Normal range of motion and neck supple. No tenderness.   Skin:     General: Skin is warm and dry.      Findings: No erythema or rash.   Neurological:      General: No focal deficit present.      Mental Status: She is alert and oriented to person, place, and time.   Psychiatric:         Mood and Affect: Mood normal.         Behavior: Behavior normal.         Thought Content: Thought content normal.         Judgment: Judgment normal.       Class 2 Severe Obesity (BMI >=35 and <=39.9). Obesity-related health conditions include the following: hypertension. Obesity is unchanged. BMI is is above average; BMI management plan is completed. We discussed portion control and increasing exercise.    Results Reviewed:  Reviewed patient's previous office visit note from annual wellness exam 2/9/2022.  Reviewed echocardiogram from 2/15/2022 which did show trace aortic valve regurgitation.  Normal diastolic function and ejection fraction.    Assessment / Plan     Assessment/Plan:  Diagnoses and all orders for this visit:    1. Hypercholesteremia (Primary)    2. Hypertension, benign    3. Prediabetes    4. Class 2 severe obesity due to excess calories with serious comorbidity and body mass index (BMI) of 35.0 to 35.9 in adult (HCC)       Patient presents to the office today for 6-month follow-up regarding hypertension and hyperlipidemia management.  She reports her blood pressure is running fairly well at home, around 125/70.  She typically has higher blood pressure readings when coming to the office as she does get a little nervous.  Would not plan for any changes to her medications at this time.    Regarding  her elevated LDL and total cholesterol, we did discuss risk of CVA/MI.  The patient is maintained on Zetia as she was intolerant to statin therapy.  We did discuss Repatha.  The patient would like a chance to bring these levels down by changing her diet, and we discussed reassessing levels in 6 months. Discussed dietary changes to help with this and also increasing exercise.     We discussed dietary changes regarding prediabetes as she is on the verge of needing medication at this point.  Information was printed in the AVS regarding a diabetic diet.    Return in about 6 months (around 2/10/2023) for Annual physical. unless patient needs to be seen sooner or acute issues arise.    I have discussed the patient results/orders and and plan/recommendation with them at today's visit.      Jeny Alexander, BELEM   08/10/2022

## 2022-08-19 ENCOUNTER — TELEPHONE (OUTPATIENT)
Dept: INTERNAL MEDICINE | Facility: CLINIC | Age: 67
End: 2022-08-19

## 2022-08-19 DIAGNOSIS — I10 HYPERTENSION, BENIGN: ICD-10-CM

## 2022-08-19 RX ORDER — AMLODIPINE BESYLATE 5 MG/1
5 TABLET ORAL DAILY
Qty: 90 TABLET | Refills: 1 | Status: SHIPPED | OUTPATIENT
Start: 2022-08-19 | End: 2023-02-15

## 2022-08-19 NOTE — TELEPHONE ENCOUNTER
Caller: Julia Diaz    Relationship: Self    Best call back number: [phone]     Requested Prescriptions:   Requested Prescriptions     Pending Prescriptions Disp Refills   • amLODIPine (NORVASC) 5 MG tablet 90 tablet 1     Sig: Take 1 tablet by mouth Daily.        Pharmacy where request should be sent: [facility] [address] [place] [address] [address] [facility] [place] [phone] [facility] [phone] [facility]     Does the patient have less than a 3 day supply:  [x] Yes  [] No    True Calderon Rep   08/19/22 09:39 CDT

## 2022-09-06 ENCOUNTER — TELEPHONE (OUTPATIENT)
Dept: INTERNAL MEDICINE | Facility: CLINIC | Age: 67
End: 2022-09-06

## 2022-09-06 NOTE — TELEPHONE ENCOUNTER
Caller: Julia Diaz    Relationship: Self    Best call back number:[phone]    What medication are you requesting: COUGH MEDICATION     What are your current symptoms: COUGH, RUNNY NOISE      How long have you been experiencing symptoms: ABOUT A WEEK     Have you had these symptoms before:    [x] Yes  [] No    Have you been treated for these symptoms before:   [x] Yes  [] No    If a prescription is needed, what is your preferred pharmacy and phone number: [facility] [address] [place] [address] [address] [facility] [phone] [facility] [phone]

## 2022-09-07 ENCOUNTER — OFFICE VISIT (OUTPATIENT)
Dept: INTERNAL MEDICINE | Facility: CLINIC | Age: 67
End: 2022-09-07

## 2022-09-07 DIAGNOSIS — J01.90 ACUTE NON-RECURRENT SINUSITIS, UNSPECIFIED LOCATION: Primary | ICD-10-CM

## 2022-09-07 PROCEDURE — 99441 PR PHYS/QHP TELEPHONE EVALUATION 5-10 MIN: CPT | Performed by: NURSE PRACTITIONER

## 2022-09-07 RX ORDER — METHYLPREDNISOLONE 4 MG/1
TABLET ORAL
Qty: 21 TABLET | Refills: 0 | Status: SHIPPED | OUTPATIENT
Start: 2022-09-07

## 2022-09-07 RX ORDER — AMOXICILLIN AND CLAVULANATE POTASSIUM 875; 125 MG/1; MG/1
1 TABLET, FILM COATED ORAL 2 TIMES DAILY
Qty: 20 TABLET | Refills: 0 | Status: SHIPPED | OUTPATIENT
Start: 2022-09-07 | End: 2022-09-17

## 2022-09-07 RX ORDER — FLUTICASONE PROPIONATE 50 MCG
2 SPRAY, SUSPENSION (ML) NASAL DAILY
COMMUNITY

## 2022-09-07 NOTE — PROGRESS NOTES
Subjective     Chief Complaint:  Runny nose, cough.    HPI:  You have chosen to receive care through a telephone visit. Do you consent to use a telephone visit for your medical care today? Yes    Patient presents via telephone visit with complaints of runny nose, cough, and sinus tenderness which started 10 days ago. She describes a cough productive of clear sputum. Her nasal drainage has been both clear and green. She denies sore throat or shortness of breath. She has had no fever or chills. She denies headache or ear pain/pressure. She has tested for COVID with two home tests, one on  and one on . These were both negative. She has been taking home medications Flonase and Claritin as well as over the counter medications Mucinex and Delsym.     Patient's PMR from outside medical facility reviewed and noted.    Past Medical History:   Past Medical History:   Diagnosis Date   • Acid reflux    • Allergic    • Arthritis    • Elevated cholesterol    • Sinusitis      Past Surgical History:  Past Surgical History:   Procedure Laterality Date   • CARPAL TUNNEL RELEASE     •  SECTION     • HYSTERECTOMY     • INNER EAR SURGERY     • REPLACEMENT TOTAL KNEE       Social History:  reports that she has quit smoking. Her smoking use included cigarettes. She has a 60.00 pack-year smoking history. She has never used smokeless tobacco. She reports that she does not drink alcohol and does not use drugs.    Family History: family history includes Cancer in her father; Heart disease in her father; Obesity in her father and mother.      Allergies:  No Known Allergies  Medications:  Prior to Admission medications    Medication Sig Start Date End Date Taking? Authorizing Provider   albuterol sulfate  (90 Base) MCG/ACT inhaler TAKE 2 PUFFS BY MOUTH EVERY 4 HOURS AS NEEDED FOR WHEEZE 22  Yes Reyes Damon MD   amLODIPine (NORVASC) 5 MG tablet Take 1 tablet by mouth Daily. 22  Yes  Jeny Alexander APRN   aspirin 81 MG chewable tablet Chew 81 mg Daily.   Yes Abril Walsh MD   esomeprazole (nexIUM) 40 MG capsule Take 1 capsule by mouth Every Morning Before Breakfast. 6/13/22  Yes Caroline Degroot DO   ezetimibe (ZETIA) 10 MG tablet Take 1 tablet by mouth Daily. 3/8/22  Yes Nidhi Fisher APRN   fluticasone (Flonase Allergy Relief) 50 MCG/ACT nasal spray 2 sprays into the nostril(s) as directed by provider Daily.   Yes Abril Walsh MD   glucose blood test strip 1 daily 7/16/20  Yes Reyes Damon MD   glucose monitor monitoring kit 1 each Daily. 7/16/20  Yes Reyes Damon MD   Lancets misc 1 each Daily. 7/16/20  Yes Reyes Damon MD   loratadine (CLARITIN) 10 MG tablet Take  by mouth.   Yes Abril Walsh MD   Naproxen Sodium (ALEVE PO) Take  by mouth.   Yes Abril Walsh MD   Vitamin D, Cholecalciferol, 50 MCG (2000 UT) capsule Take 1 capsule by mouth Daily. 2/9/22  Yes Nidhi Fisher APRN   amoxicillin-clavulanate (Augmentin) 875-125 MG per tablet Take 1 tablet by mouth 2 (Two) Times a Day for 10 days. 9/7/22 9/17/22  Jeny Alexander APRN   methylPREDNISolone (MEDROL) 4 MG dose pack Take as directed on package instructions. 9/7/22   Jeny Alexander APRN       Objective     Vital Signs: There were no vitals taken for this visit.  Physical Exam  No physical exam- telephone visit.    Assessment / Plan     Assessment/Plan:  Diagnoses and all orders for this visit:    1. Acute non-recurrent sinusitis, unspecified location (Primary)    Other orders  -     methylPREDNISolone (MEDROL) 4 MG dose pack; Take as directed on package instructions.  Dispense: 21 tablet; Refill: 0  -     amoxicillin-clavulanate (Augmentin) 875-125 MG per tablet; Take 1 tablet by mouth 2 (Two) Times a Day for 10 days.  Dispense: 20 tablet; Refill: 0       Patient with 10 days of sinus symptoms and cough. She has been taking Flonase, Claritin, Mucinex and Delsym. Likely  started as viral upper respiratory infection, however as she has had 10 days of symptoms now, will prescribe Augmentin and Medrol dose pack. Continue over the counter and home allergy medications as above. Patient to call for any worsening symptoms.     Return if symptoms worsen or fail to improve. unless patient needs to be seen sooner or acute issues arise.    I have discussed the patient results/orders and and plan/recommendation with them at today's visit.  Total time spent on the phone with patient was 5 minutes and 11 seconds.    Jeny Alexander, APRN   09/07/2022

## 2022-09-12 RX ORDER — ESOMEPRAZOLE MAGNESIUM 40 MG/1
CAPSULE, DELAYED RELEASE ORAL
Qty: 90 CAPSULE | Refills: 3 | Status: SHIPPED | OUTPATIENT
Start: 2022-09-12

## 2022-09-23 RX ORDER — EZETIMIBE 10 MG/1
10 TABLET ORAL DAILY
Qty: 90 TABLET | Refills: 3 | Status: SHIPPED | OUTPATIENT
Start: 2022-09-23

## 2023-02-15 DIAGNOSIS — I10 HYPERTENSION, BENIGN: ICD-10-CM

## 2023-02-15 RX ORDER — AMLODIPINE BESYLATE 5 MG/1
TABLET ORAL
Qty: 90 TABLET | Refills: 1 | Status: SHIPPED | OUTPATIENT
Start: 2023-02-15

## 2023-03-29 ENCOUNTER — TELEPHONE (OUTPATIENT)
Dept: INTERNAL MEDICINE | Facility: CLINIC | Age: 68
End: 2023-03-29

## 2023-03-29 NOTE — TELEPHONE ENCOUNTER
Caller: Julia Diaz    Relationship to patient: Self    Best call back number: [phone]    Chief complaint: YEARLY LAB    Type of visit: LAB    Requested date: N/A    Additional notes:    PATIENT WOULD LIKE TO CANCEL LAB APPOINTMENT ON 04.05.23. PATIENT WOULD LIKE TO COME IN AS A WALK IN [place].

## 2023-04-10 ENCOUNTER — LAB (OUTPATIENT)
Dept: INTERNAL MEDICINE | Facility: CLINIC | Age: 68
End: 2023-04-10
Payer: MEDICARE

## 2023-04-10 DIAGNOSIS — R73.01 IMPAIRED FASTING GLUCOSE: ICD-10-CM

## 2023-04-10 DIAGNOSIS — I10 HYPERTENSION, BENIGN: Primary | ICD-10-CM

## 2023-04-10 DIAGNOSIS — E55.9 VITAMIN D DEFICIENCY: ICD-10-CM

## 2023-04-10 DIAGNOSIS — E78.00 HYPERCHOLESTEREMIA: ICD-10-CM

## 2023-04-11 LAB
25(OH)D3+25(OH)D2 SERPL-MCNC: 52.3 NG/ML (ref 30–100)
ALBUMIN SERPL-MCNC: 4.3 G/DL (ref 3.5–5.2)
ALBUMIN/GLOB SERPL: 1.7 G/DL
ALP SERPL-CCNC: 113 U/L (ref 39–117)
ALT SERPL-CCNC: 20 U/L (ref 1–33)
APPEARANCE UR: CLEAR
AST SERPL-CCNC: 18 U/L (ref 1–32)
BACTERIA #/AREA URNS HPF: ABNORMAL /HPF
BASOPHILS # BLD AUTO: 0.08 10*3/MM3 (ref 0–0.2)
BASOPHILS NFR BLD AUTO: 0.9 % (ref 0–1.5)
BILIRUB SERPL-MCNC: 0.3 MG/DL (ref 0–1.2)
BILIRUB UR QL STRIP: NEGATIVE
BUN SERPL-MCNC: 12 MG/DL (ref 8–23)
BUN/CREAT SERPL: 14.1 (ref 7–25)
CALCIUM SERPL-MCNC: 9.9 MG/DL (ref 8.6–10.5)
CASTS URNS MICRO: ABNORMAL
CHLORIDE SERPL-SCNC: 102 MMOL/L (ref 98–107)
CHOLEST SERPL-MCNC: 216 MG/DL (ref 0–200)
CO2 SERPL-SCNC: 28.4 MMOL/L (ref 22–29)
COLOR UR: YELLOW
CREAT SERPL-MCNC: 0.85 MG/DL (ref 0.57–1)
EGFRCR SERPLBLD CKD-EPI 2021: 73.8 ML/MIN/1.73
EOSINOPHIL # BLD AUTO: 0.14 10*3/MM3 (ref 0–0.4)
EOSINOPHIL NFR BLD AUTO: 1.5 % (ref 0.3–6.2)
EPI CELLS #/AREA URNS HPF: ABNORMAL /HPF
ERYTHROCYTE [DISTWIDTH] IN BLOOD BY AUTOMATED COUNT: 13.6 % (ref 12.3–15.4)
GLOBULIN SER CALC-MCNC: 2.6 GM/DL
GLUCOSE SERPL-MCNC: 108 MG/DL (ref 65–99)
GLUCOSE UR QL STRIP: NEGATIVE
HBA1C MFR BLD: 6 % (ref 4.8–5.6)
HCT VFR BLD AUTO: 49.1 % (ref 34–46.6)
HDLC SERPL-MCNC: 43 MG/DL (ref 40–60)
HGB BLD-MCNC: 15.9 G/DL (ref 12–15.9)
HGB UR QL STRIP: NEGATIVE
IMM GRANULOCYTES # BLD AUTO: 0.04 10*3/MM3 (ref 0–0.05)
IMM GRANULOCYTES NFR BLD AUTO: 0.4 % (ref 0–0.5)
KETONES UR QL STRIP: NEGATIVE
LDLC SERPL CALC-MCNC: 137 MG/DL (ref 0–100)
LEUKOCYTE ESTERASE UR QL STRIP: ABNORMAL
LYMPHOCYTES # BLD AUTO: 3.2 10*3/MM3 (ref 0.7–3.1)
LYMPHOCYTES NFR BLD AUTO: 34.6 % (ref 19.6–45.3)
MCH RBC QN AUTO: 29.8 PG (ref 26.6–33)
MCHC RBC AUTO-ENTMCNC: 32.4 G/DL (ref 31.5–35.7)
MCV RBC AUTO: 91.9 FL (ref 79–97)
MONOCYTES # BLD AUTO: 0.57 10*3/MM3 (ref 0.1–0.9)
MONOCYTES NFR BLD AUTO: 6.2 % (ref 5–12)
NEUTROPHILS # BLD AUTO: 5.23 10*3/MM3 (ref 1.7–7)
NEUTROPHILS NFR BLD AUTO: 56.4 % (ref 42.7–76)
NITRITE UR QL STRIP: NEGATIVE
NRBC BLD AUTO-RTO: 0 /100 WBC (ref 0–0.2)
PH UR STRIP: 6 [PH] (ref 5–8)
PLATELET # BLD AUTO: 339 10*3/MM3 (ref 140–450)
POTASSIUM SERPL-SCNC: 4.6 MMOL/L (ref 3.5–5.2)
PROT SERPL-MCNC: 6.9 G/DL (ref 6–8.5)
PROT UR QL STRIP: NEGATIVE
RBC # BLD AUTO: 5.34 10*6/MM3 (ref 3.77–5.28)
RBC #/AREA URNS HPF: ABNORMAL /HPF
SODIUM SERPL-SCNC: 139 MMOL/L (ref 136–145)
SP GR UR STRIP: 1.01 (ref 1–1.03)
TRIGL SERPL-MCNC: 198 MG/DL (ref 0–150)
TSH SERPL DL<=0.005 MIU/L-ACNC: 1.4 UIU/ML (ref 0.27–4.2)
UROBILINOGEN UR STRIP-MCNC: ABNORMAL MG/DL
VLDLC SERPL CALC-MCNC: 36 MG/DL (ref 5–40)
WBC # BLD AUTO: 9.26 10*3/MM3 (ref 3.4–10.8)
WBC #/AREA URNS HPF: ABNORMAL /HPF

## 2023-04-12 ENCOUNTER — OFFICE VISIT (OUTPATIENT)
Dept: INTERNAL MEDICINE | Facility: CLINIC | Age: 68
End: 2023-04-12
Payer: MEDICARE

## 2023-04-12 VITALS
WEIGHT: 198.7 LBS | BODY MASS INDEX: 36.57 KG/M2 | SYSTOLIC BLOOD PRESSURE: 142 MMHG | HEIGHT: 62 IN | OXYGEN SATURATION: 97 % | TEMPERATURE: 97.3 F | HEART RATE: 87 BPM | DIASTOLIC BLOOD PRESSURE: 84 MMHG

## 2023-04-12 DIAGNOSIS — Z12.11 SCREENING FOR COLORECTAL CANCER: ICD-10-CM

## 2023-04-12 DIAGNOSIS — Z12.12 SCREENING FOR COLORECTAL CANCER: ICD-10-CM

## 2023-04-12 DIAGNOSIS — E66.01 CLASS 2 SEVERE OBESITY DUE TO EXCESS CALORIES WITH SERIOUS COMORBIDITY AND BODY MASS INDEX (BMI) OF 36.0 TO 36.9 IN ADULT: ICD-10-CM

## 2023-04-12 DIAGNOSIS — R73.03 PREDIABETES: ICD-10-CM

## 2023-04-12 DIAGNOSIS — Z78.0 POST-MENOPAUSAL: ICD-10-CM

## 2023-04-12 DIAGNOSIS — Z72.0 TOBACCO USE: ICD-10-CM

## 2023-04-12 DIAGNOSIS — Z12.31 ENCOUNTER FOR SCREENING MAMMOGRAM FOR MALIGNANT NEOPLASM OF BREAST: ICD-10-CM

## 2023-04-12 DIAGNOSIS — E78.2 MIXED HYPERLIPIDEMIA: ICD-10-CM

## 2023-04-12 DIAGNOSIS — Z00.00 ENCOUNTER FOR SUBSEQUENT ANNUAL WELLNESS VISIT (AWV) IN MEDICARE PATIENT: Primary | ICD-10-CM

## 2023-04-12 DIAGNOSIS — I10 HYPERTENSION, BENIGN: ICD-10-CM

## 2023-04-12 RX ORDER — AMLODIPINE BESYLATE 10 MG/1
10 TABLET ORAL DAILY
Qty: 30 TABLET | Refills: 3 | Status: SHIPPED | OUTPATIENT
Start: 2023-04-12

## 2023-04-12 NOTE — PROGRESS NOTES
The ABCs of the Annual Wellness Visit  Subsequent Medicare Wellness Visit    Subjective    Julia Diaz is a 68 y.o. female who presents for a Subsequent Medicare Wellness Visit.    The following portions of the patient's history were reviewed and   updated as appropriate: allergies, current medications, past family history, past medical history, past social history, past surgical history and problem list.    Compared to one year ago, the patient feels her physical   health is better.    Compared to one year ago, the patient feels her mental   health is better.    Recent Hospitalizations:  She was not admitted to the hospital during the last year.       Current Medical Providers:  Patient Care Team:  Jeny Alexander APRN as PCP - General (Nurse Practitioner)    Outpatient Medications Prior to Visit   Medication Sig Dispense Refill   • albuterol sulfate  (90 Base) MCG/ACT inhaler TAKE 2 PUFFS BY MOUTH EVERY 4 HOURS AS NEEDED FOR WHEEZE 18 g 2   • aspirin 81 MG chewable tablet Chew 1 tablet Daily.     • esomeprazole (nexIUM) 40 MG capsule TAKE 1 CAPSULE BY MOUTH ONCE DAILY IN THE MORNING BEFORE BREAKFAST 90 capsule 3   • ezetimibe (ZETIA) 10 MG tablet Take 1 tablet by mouth Daily. 90 tablet 3   • fluticasone (FLONASE) 50 MCG/ACT nasal spray 2 sprays into the nostril(s) as directed by provider Daily.     • loratadine (CLARITIN) 10 MG tablet Take  by mouth.     • Naproxen Sodium (ALEVE PO) Take  by mouth.     • Vitamin D, Cholecalciferol, 50 MCG (2000 UT) capsule Take 1 capsule by mouth Daily. 30 capsule 5   • amLODIPine (NORVASC) 5 MG tablet Take 1 tablet by mouth once daily 90 tablet 1   • glucose blood test strip 1 daily (Patient not taking: Reported on 4/12/2023) 100 each 3   • glucose monitor monitoring kit 1 each Daily. (Patient not taking: Reported on 4/12/2023) 1 each 0   • Lancets misc 1 each Daily. (Patient not taking: Reported on 4/12/2023) 100 each 3   • methylPREDNISolone (MEDROL) 4 MG dose pack  "Take as directed on package instructions. 21 tablet 0     No facility-administered medications prior to visit.       No opioid medication identified on active medication list. I have reviewed chart for other potential  high risk medication/s and harmful drug interactions in the elderly.          Aspirin is on active medication list. Aspirin use is indicated based on review of current medical condition/s. Pros and cons of this therapy have been discussed today. Benefits of this medication outweigh potential harm.  Patient has been encouraged to continue taking this medication.  .      Patient Active Problem List   Diagnosis   • Abscess, perirectal   • Anxiety   • Arthritis of knee   • Arthropathy, multiple sites   • Gastroesophageal reflux disease without esophagitis   • Mixed hyperlipidemia   • Hypertension, benign   • Impaired fasting glucose   • Insomnia, controlled   • Tobacco use   • Vitamin D deficiency   • Prediabetes   • Class 2 severe obesity due to excess calories with serious comorbidity and body mass index (BMI) of 36.0 to 36.9 in adult     Advance Care Planning   Advance Care Planning     Advance Directive is not on file.  ACP discussion was held with the patient during this visit. Patient does not have an advance directive, declines further assistance.     Objective    Vitals:    04/12/23 0941   BP: 142/84   BP Location: Right arm   Patient Position: Sitting   Cuff Size: Adult   Pulse: 87   Temp: 97.3 °F (36.3 °C)   TempSrc: Temporal   SpO2: 97%   Weight: 90.1 kg (198 lb 11.2 oz)   Height: 157.5 cm (62\")   PainSc: 0-No pain     Estimated body mass index is 36.34 kg/m² as calculated from the following:    Height as of this encounter: 157.5 cm (62\").    Weight as of this encounter: 90.1 kg (198 lb 11.2 oz).    Class 2 Severe Obesity (BMI >=35 and <=39.9). Obesity-related health conditions include the following: hypertension, dyslipidemias and osteoarthritis. Obesity is unchanged. BMI is is above average; " BMI management plan is completed. We discussed portion control and increasing exercise.      Does the patient have evidence of cognitive impairment? No    Lab Results   Component Value Date    CHLPL 216 (H) 04/10/2023    TRIG 198 (H) 04/10/2023    HDL 43 04/10/2023     (H) 04/10/2023    VLDL 36 04/10/2023    HGBA1C 6.00 (H) 04/10/2023        HEALTH RISK ASSESSMENT    Smoking Status:  Social History     Tobacco Use   Smoking Status Every Day   • Packs/day: 0.50   • Years: 50.00   • Pack years: 25.00   • Types: Cigarettes   Smokeless Tobacco Never     Alcohol Consumption:  Social History     Substance and Sexual Activity   Alcohol Use No     Fall Risk Screen:    STEADI Fall Risk Assessment was completed, and patient is at LOW risk for falls.Assessment completed on:2023    Depression Screenin/12/2023     9:43 AM   PHQ-2/PHQ-9 Depression Screening   Little Interest or Pleasure in Doing Things 0-->not at all   Feeling Down, Depressed or Hopeless 0-->not at all   PHQ-9: Brief Depression Severity Measure Score 0       Health Habits and Functional and Cognitive Screenin/12/2023     9:44 AM   Functional & Cognitive Status   Do you have difficulty preparing food and eating? No   Do you have difficulty bathing yourself, getting dressed or grooming yourself? No   Do you have difficulty using the toilet? No   Do you have difficulty moving around from place to place? No   Do you have trouble with steps or getting out of a bed or a chair? No   Current Diet Well Balanced Diet   Dental Exam Not up to date   Eye Exam Up to date   Exercise (times per week) 0 times per week   Current Exercises Include No Regular Exercise   Do you need help using the phone?  No   Are you deaf or do you have serious difficulty hearing?  No   Do you need help with transportation? No   Do you need help shopping? No   Do you need help preparing meals?  No   Do you need help with housework?  No   Do you need help with laundry?  No   Do you need help taking your medications? No   Do you need help managing money? No   Do you ever drive or ride in a car without wearing a seat belt? No   Have you felt unusual stress, anger or loneliness in the last month? No   Who do you live with? Spouse   If you need help, do you have trouble finding someone available to you? No   Have you been bothered in the last four weeks by sexual problems? No   Do you have difficulty concentrating, remembering or making decisions? No       Age-appropriate Screening Schedule:  Refer to the list below for future screening recommendations based on patient's age, sex and/or medical conditions. Orders for these recommended tests are listed in the plan section. The patient has been provided with a written plan.    Health Maintenance   Topic Date Due   • COLORECTAL CANCER SCREENING  Never done   • DXA SCAN  03/05/2023   • ZOSTER VACCINE (1 of 2) 04/12/2023 (Originally 2/13/2003)   • COVID-19 Vaccine (1) 04/14/2023 (Originally 1955)   • Pneumococcal Vaccine 65+ (1 - PCV) 04/12/2024 (Originally 2/13/2018)   • TDAP/TD VACCINES (1 - Tdap) 04/12/2024 (Originally 2/13/1974)   • INFLUENZA VACCINE  08/01/2023   • MAMMOGRAM  03/17/2024   • LIPID PANEL  04/10/2024   • ANNUAL WELLNESS VISIT  04/12/2024   • HEPATITIS C SCREENING  Completed                  CMS Preventative Services Quick Reference  Risk Factors Identified During Encounter  Immunizations Discussed/Encouraged: Prevnar 20 (Pneumococcal 20-valent conjugate) and Shingrix  Inactivity/Sedentary: Patient was advised to exercise at least 150 minutes a week per CDC recommendations.  Tobacco Use/Dependance Risk (use dotphrase .tobaccocessation for documentation)  The above risks/problems have been discussed with the patient.  Pertinent information has been shared with the patient in the After Visit Summary.  An After Visit Summary and PPPS were made available to the patient.    Follow Up:   Next Medicare Wellness visit to be  "scheduled in 1 year.       Additional E&M Note during same encounter follows:  Patient has multiple medical problems which are significant and separately identifiable that require additional work above and beyond the Medicare Wellness Visit.      Chief Complaint  Medicare Wellness-subsequent (Labs in chart)    Subjective        HPI  Julia Diaz is also being seen today for hypertension. The patient's blood pressure in the office today is 142/84.  The patient does monitor her blood pressure frequently at home and states it runs around this level at home as well.  She denies any chest pain, shortness of breath.  She denies dizziness, lightheadedness or frequent headaches.  She does have a family history of heart disease stating that she believes her father had stents.  She is taking an 81 mg aspirin daily.  She states she did try atorvastatin for her cholesterol however this gave her muscle aches and she is now maintained on Zetia.  She has been taking over-the-counter fish oil as well.  She states that she has been trying to decrease red meat intake and increase leaner meats.  She has also been working on portion control.  This has been difficult as her  keeps candy and snack cakes in the house and this does tempt her.     Objective   Vital Signs:  /84 (BP Location: Right arm, Patient Position: Sitting, Cuff Size: Adult)   Pulse 87   Temp 97.3 °F (36.3 °C) (Temporal)   Ht 157.5 cm (62\")   Wt 90.1 kg (198 lb 11.2 oz)   SpO2 97%   BMI 36.34 kg/m²     Physical Exam     The following data was reviewed by: BELEM Henderson on 04/12/2023:  CMP        8/8/2022    07:47 8/8/2022    22:31 4/10/2023    09:50   CMP   Glucose 112   126   108     BUN 14   13   12     Creatinine 0.92   0.88   0.85     EGFR  71.2      Sodium 140   141   139     Potassium 6.3   4.0   4.6     Chloride 103   104   102     Calcium 9.9   9.4   9.9     Total Protein 6.3    6.9     Total Protein  6.7      Albumin 4.40   4.20   " 4.3     Globulin 1.9    2.6     Globulin  2.5      Total Bilirubin 0.2   <0.2   0.3     Alkaline Phosphatase 95   94   113     AST (SGOT) 18   20   18     ALT (SGPT) 20   18   20     Albumin/Globulin Ratio  1.7      BUN/Creatinine Ratio 15.2   14.8   14.1     Anion Gap  13.0        CBC        8/8/2022    22:31 4/10/2023    09:50   CBC   WBC 13.74   9.26     RBC 5.21   5.34     Hemoglobin 15.9   15.9     Hematocrit 46.7   49.1     MCV 89.6   91.9     MCH 30.5   29.8     MCHC 34.0   32.4     RDW 14.5   13.6     Platelets 337   339       Lipid Panel        8/8/2022    07:47 4/10/2023    09:50   Lipid Panel   Total Cholesterol 218   216     Triglycerides 176   198     HDL Cholesterol 43   43     VLDL Cholesterol 32   36     LDL Cholesterol  143   137       TSH        4/10/2023    09:50   TSH   TSH 1.400       Most Recent A1C        4/10/2023    09:50   HGBA1C Most Recent   Hemoglobin A1C 6.00       UA        4/10/2023    09:50   Urinalysis   Blood, UA Negative     Leukocytes, UA Trace     Nitrite, UA Negative     RBC, UA 0-2     Bacteria, UA Comment       Data reviewed: Radiologic studies Mammogram from 3/2022-negative.  DEXA scan from 3/21 shows osteopenia.           Assessment and Plan   Diagnoses and all orders for this visit:    1. Encounter for subsequent annual wellness visit (AWV) in Medicare patient (Primary)    2. Hypertension, benign  -     amLODIPine (NORVASC) 10 MG tablet; Take 1 tablet by mouth Daily.  Dispense: 30 tablet; Refill: 3    3. Encounter for screening mammogram for malignant neoplasm of breast  -     Mammo Screening Digital Tomosynthesis Bilateral With CAD; Future    4. Post-menopausal  -     DEXA Bone Density Axial; Future    5. Screening for colorectal cancer  -     Cologuard - Stool, Per Rectum; Future    6. Prediabetes    7. Mixed hyperlipidemia    8. Class 2 severe obesity due to excess calories with serious comorbidity and body mass index (BMI) of 36.0 to 36.9 in adult    9. Tobacco  use      Patient presents to the office today for subsequent Medicare wellness exam.  She denies any acute concerns or complaints today.  Her blood pressure is elevated in the office at 142/84.  She indicates that this is about what it runs at home as well.  At this time, would like to increase her amlodipine from 5 mg to 10 mg.  We did discuss possible side effect of lower extremity swelling and she has been encouraged to reach out if she experiences this or any other side effects.  I did provide her with a blood pressure log and she will bring this back to the office in 1 week for me to review.    Labs were reviewed with the patient indicate normal renal function, electrolytes, liver function testing. Hemoglobin A1c still remains within the prediabetic range at 6.0.  This has improved from August 2022, last noted at 6.3.  The patient's lipid panel indicates very mild improvement of total and LDL cholesterol but worsening triglycerides.  The patient had previously tried atorvastatin and this did give her muscle cramps therefore she is maintained on Zetia.  She has also been taking fish oil.  We discussed supplementation with red yeast rice as well.  Patient has also been working on her diet with increasing leaner meats and decreasing red meat intake.  She is also starting to work on portion control.  She does have difficulty with her sweet intake and is going to be working on this as well.  We will plan to reassess lipid panel within 6 months.    Vitamin D level is within normal limits.  Patient is taking an over-the-counter supplement.  Her last DEXA scan was in March 2021 which did indicate osteopenia.  She is due for repeat DEXA scan.  If this shows any worsening of her T score, may consider Prolia therapy.  Will await results and make changes to plan of care as necessary.  Patient is due for mammogram, last performed in March 2022 which was negative.  The patient states she was due for repeat colonoscopy  sometime within the last 2 years.  She is agreeable to Cologuard testing which has been ordered.  Patient declines any vaccines today.    Julia Diaz  reports that she has been smoking cigarettes. She has a 25.00 pack-year smoking history. She has never used smokeless tobacco.. I have educated her on the risk of diseases from using tobacco products such as cancer, COPD and heart disease.     I advised her to quit and she is not willing to quit.    I spent 3  minutes counseling the patient.       I spent 50 minutes caring for Julia on this date of service. This time includes time spent by me in the following activities:preparing for the visit, reviewing tests, obtaining and/or reviewing a separately obtained history, performing a medically appropriate examination and/or evaluation , counseling and educating the patient/family/caregiver, ordering medications, tests, or procedures and documenting information in the medical record  Follow Up   Return in about 3 months (around 7/12/2023).  Patient was given instructions and counseling regarding her condition or for health maintenance advice. Please see specific information pulled into the AVS if appropriate.

## 2023-04-18 DIAGNOSIS — Z12.31 ENCOUNTER FOR SCREENING MAMMOGRAM FOR MALIGNANT NEOPLASM OF BREAST: ICD-10-CM

## 2023-04-18 DIAGNOSIS — Z78.0 POST-MENOPAUSAL: ICD-10-CM

## 2023-05-10 ENCOUNTER — HOSPITAL ENCOUNTER (OUTPATIENT)
Dept: GENERAL RADIOLOGY | Facility: HOSPITAL | Age: 68
Discharge: HOME | End: 2023-05-10
Admitting: NURSE PRACTITIONER
Payer: MEDICARE

## 2023-05-10 ENCOUNTER — OFFICE VISIT (OUTPATIENT)
Dept: INTERNAL MEDICINE | Facility: CLINIC | Age: 68
End: 2023-05-10
Payer: MEDICARE

## 2023-05-10 VITALS
TEMPERATURE: 97.8 F | DIASTOLIC BLOOD PRESSURE: 84 MMHG | SYSTOLIC BLOOD PRESSURE: 110 MMHG | HEART RATE: 77 BPM | RESPIRATION RATE: 18 BRPM | OXYGEN SATURATION: 96 % | BODY MASS INDEX: 36.99 KG/M2 | WEIGHT: 201 LBS | HEIGHT: 62 IN

## 2023-05-10 DIAGNOSIS — M79.671 RIGHT FOOT PAIN: ICD-10-CM

## 2023-05-10 DIAGNOSIS — M79.671 RIGHT FOOT PAIN: Primary | ICD-10-CM

## 2023-05-10 PROCEDURE — 73630 X-RAY EXAM OF FOOT: CPT

## 2023-05-10 RX ORDER — CHLORAL HYDRATE 500 MG
1000 CAPSULE ORAL
COMMUNITY

## 2023-05-10 RX ORDER — METHYLPREDNISOLONE 4 MG/1
TABLET ORAL
Qty: 21 TABLET | Refills: 0 | Status: SHIPPED | OUTPATIENT
Start: 2023-05-10

## 2023-05-10 RX ORDER — TRIAMCINOLONE ACETONIDE 40 MG/ML
40 INJECTION, SUSPENSION INTRA-ARTICULAR; INTRAMUSCULAR ONCE
Status: COMPLETED | OUTPATIENT
Start: 2023-05-10 | End: 2023-05-10

## 2023-05-10 RX ORDER — KETOROLAC TROMETHAMINE 30 MG/ML
30 INJECTION, SOLUTION INTRAMUSCULAR; INTRAVENOUS ONCE
Status: COMPLETED | OUTPATIENT
Start: 2023-05-10 | End: 2023-05-10

## 2023-05-10 RX ADMIN — TRIAMCINOLONE ACETONIDE 40 MG: 40 INJECTION, SUSPENSION INTRA-ARTICULAR; INTRAMUSCULAR at 13:38

## 2023-05-10 RX ADMIN — KETOROLAC TROMETHAMINE 30 MG: 30 INJECTION, SOLUTION INTRAMUSCULAR; INTRAVENOUS at 13:38

## 2023-05-10 NOTE — PROGRESS NOTES
Subjective     Chief Complaint:  Right foot pain    HPI:  The patient presents to the office today with complaints of right foot pain. This first occurred about 3 weeks ago when the patient was vacuuming at [employer]. This has progressively gotten worse since that time. The patient has had no injuries or trauma that she is aware of. The pain is located midfoot on the top part of her foot. She states over the weekend she had significant swelling of her foot. She used an ice pack and kept this elevated which helped with her swelling but she states her foot actually hurts worse with improvement of the swelling.  She states when her foot was swollen it was causing some numbness and tingling to the bottom of her foot which radiated forward towards her toes.  She does not necessarily have pain that is worse with weightbearing.  She has not noted any redness or discoloration to her skin.  She does take Aleve on a daily basis normally and has not noted any improvement of her pain with taking this.    Patient's PMR from outside medical facility reviewed and noted.    Past Medical History:   Past Medical History:   Diagnosis Date    Acid reflux     Allergic     Arthritis     Elevated cholesterol     History of medical problems knee replacement    Pneumonia 2021    Sinusitis      Past Surgical History:  Past Surgical History:   Procedure Laterality Date    CARPAL TUNNEL RELEASE       SECTION      HYSTERECTOMY      INNER EAR SURGERY      REPLACEMENT TOTAL KNEE       Social History:  reports that she has been smoking cigarettes. She has a 25.00 pack-year smoking history. She has never used smokeless tobacco. She reports that she does not drink alcohol and does not use drugs.    Family History: family history includes Cancer in her father; Heart disease in her father; Obesity in her father and mother; Vision loss in her maternal grandmother.      Allergies:  No Known Allergies  Medications:  Prior to Admission  "medications    Medication Sig Start Date End Date Taking? Authorizing Provider   albuterol sulfate  (90 Base) MCG/ACT inhaler TAKE 2 PUFFS BY MOUTH EVERY 4 HOURS AS NEEDED FOR WHEEZE 1/20/22  Yes Reyes Damon MD   amLODIPine (NORVASC) 10 MG tablet Take 1 tablet by mouth Daily. 4/12/23  Yes Jeny Alexander APRN   aspirin 81 MG chewable tablet Chew 1 tablet Daily.   Yes Abril Walsh MD   esomeprazole (nexIUM) 40 MG capsule TAKE 1 CAPSULE BY MOUTH ONCE DAILY IN THE MORNING BEFORE BREAKFAST 9/12/22  Yes Caroline Degroot   ezetimibe (ZETIA) 10 MG tablet Take 1 tablet by mouth Daily. 9/23/22  Yes Jeny Alexander APRN   fluticasone (FLONASE) 50 MCG/ACT nasal spray 2 sprays into the nostril(s) as directed by provider Daily.   Yes Abril Walsh MD   loratadine (CLARITIN) 10 MG tablet Take  by mouth.   Yes Abril Walsh MD   Naproxen Sodium (ALEVE PO) Take  by mouth.   Yes Abril Walsh MD   Omega-3 Fatty Acids (fish oil) 1000 MG capsule capsule Take 1 capsule by mouth Daily With Breakfast.   Yes Abril Walsh MD   Vitamin D, Cholecalciferol, 50 MCG (2000 UT) capsule Take 1 capsule by mouth Daily. 2/9/22  Yes Nidhi Fisher APRN   methylPREDNISolone (MEDROL) 4 MG dose pack Take as directed on package instructions. 5/10/23   Jeny Alexander APRN       Objective     Vital Signs: /84 (BP Location: Left arm, Patient Position: Sitting, Cuff Size: Adult)   Pulse 77   Temp 97.8 °F (36.6 °C) (Infrared)   Resp 18   Ht 157.5 cm (62\")   Wt 91.2 kg (201 lb)   SpO2 96%   BMI 36.76 kg/m²   Physical Exam  Vitals and nursing note reviewed.   Constitutional:       General: She is not in acute distress.     Appearance: She is obese. She is not ill-appearing or toxic-appearing.   HENT:      Head: Normocephalic and atraumatic.      Mouth/Throat:      Mouth: Mucous membranes are moist.      Pharynx: Oropharynx is clear.   Cardiovascular:      Rate and Rhythm: Normal " rate and regular rhythm.      Pulses: Normal pulses.      Heart sounds: Normal heart sounds.   Pulmonary:      Effort: Pulmonary effort is normal.      Breath sounds: No wheezing, rhonchi or rales.   Abdominal:      General: Bowel sounds are normal. There is no distension.      Palpations: Abdomen is soft.      Tenderness: There is no abdominal tenderness.   Musculoskeletal:         General: No swelling or tenderness. Normal range of motion.      Cervical back: Normal range of motion and neck supple. No tenderness.        Feet:    Feet:      Right foot:      Skin integrity: No erythema or warmth.      Toenail Condition: Right toenails are ingrown.      Comments: Tenderness to palpation. Right foot trace edema.  Skin:     General: Skin is warm and dry.      Findings: No erythema or rash.   Neurological:      General: No focal deficit present.      Mental Status: She is alert and oriented to person, place, and time.   Psychiatric:         Mood and Affect: Mood normal.         Behavior: Behavior normal.         Thought Content: Thought content normal.         Judgment: Judgment normal.     Results Reviewed:  Right foot x-ray shows no acute abnormalities.    Assessment / Plan     Assessment/Plan:  Diagnoses and all orders for this visit:    1. Right foot pain (Primary)  -     XR Foot 3+ View Right; Future  -     Uric acid  -     C-reactive protein  -     ketorolac (TORADOL) injection 30 mg  -     triamcinolone acetonide (KENALOG-40) injection 40 mg  -     methylPREDNISolone (MEDROL) 4 MG dose pack; Take as directed on package instructions.  Dispense: 21 tablet; Refill: 0       Patient presents to the office today with complaints of right foot pain.  This started approximately 3 weeks ago while she was vacuuming at [employer].  She has not had any acute injuries or trauma that she is aware of.  She states the pain has progressively gotten worse.  She did have significant swelling noted over the weekend which has improved  with using ice and elevating the right lower extremity.  She does still have some slight residual edema today.  She does have tenderness to palpation noted to the midfoot on the dorsal surface.  X-ray of the right foot was negative for any fracture or acute process.  We will check labs today for uric acid and CRP as this could possibly be gout, and will treat as such.  She will be given Toradol and Kenalog injections in the office today followed by Medrol Dosepak to start tomorrow.  Short-term follow-up in 2 weeks to reassess symptoms.  Patient to call for any worsening of symptoms or new concerns prior to that time.    Return in about 2 weeks (around 5/24/2023) for Recheck. unless patient needs to be seen sooner or acute issues arise.    I have discussed the patient results/orders and and plan/recommendation with them at today's visit.      Jeny Alexander, BELEM   05/10/2023      Answers submitted by the patient for this visit:  Other (Submitted on 5/9/2023)  Please describe your symptoms.: foot swelling and painful  Have you had these symptoms before?: No  How long have you been having these symptoms?: Greater than 2 weeks  Please list any medications you are currently taking for this condition.: none  Please describe any probable cause for these symptoms. : don't know  Primary Reason for Visit (Submitted on 5/9/2023)  What is the primary reason for your visit?: Other

## 2023-05-11 DIAGNOSIS — R19.5 POSITIVE COLORECTAL CANCER SCREENING USING COLOGUARD TEST: Primary | ICD-10-CM

## 2023-05-11 LAB
CRP SERPL-MCNC: 0.31 MG/DL (ref 0–0.5)
URATE SERPL-MCNC: 5.5 MG/DL (ref 2.4–5.7)

## 2023-05-24 ENCOUNTER — OFFICE VISIT (OUTPATIENT)
Dept: INTERNAL MEDICINE | Facility: CLINIC | Age: 68
End: 2023-05-24
Payer: MEDICARE

## 2023-05-24 VITALS
DIASTOLIC BLOOD PRESSURE: 86 MMHG | HEART RATE: 88 BPM | RESPIRATION RATE: 16 BRPM | BODY MASS INDEX: 36.58 KG/M2 | HEIGHT: 62 IN | TEMPERATURE: 97.3 F | SYSTOLIC BLOOD PRESSURE: 142 MMHG | OXYGEN SATURATION: 96 % | WEIGHT: 198.8 LBS

## 2023-05-24 DIAGNOSIS — M79.671 RIGHT FOOT PAIN: Primary | ICD-10-CM

## 2023-05-24 PROCEDURE — 1160F RVW MEDS BY RX/DR IN RCRD: CPT | Performed by: NURSE PRACTITIONER

## 2023-05-24 PROCEDURE — 1159F MED LIST DOCD IN RCRD: CPT | Performed by: NURSE PRACTITIONER

## 2023-05-24 PROCEDURE — 3077F SYST BP >= 140 MM HG: CPT | Performed by: NURSE PRACTITIONER

## 2023-05-24 PROCEDURE — 99213 OFFICE O/P EST LOW 20 MIN: CPT | Performed by: NURSE PRACTITIONER

## 2023-05-24 PROCEDURE — 3079F DIAST BP 80-89 MM HG: CPT | Performed by: NURSE PRACTITIONER

## 2023-05-24 NOTE — PROGRESS NOTES
Subjective     Chief Complaint:  Follow-up right foot pain    HPI:  Patient presents to the office today for a 2-week follow-up regarding right foot pain.  She was last seen on 5/10 with these complaints.  This had occurred 3 weeks prior to her appointment when she was vacuuming at [employer] and had progressively gotten worse.  She denied any other injuries or trauma.  She had had some significant swelling of her foot prior to her last office visit and stated that her foot hurts worse with improvement of the swelling.  Her pain was not worse with weightbearing.  She had not noted any redness or discoloration to her skin.  At her last office visit, an x-ray was performed which showed no acute bony abnormalities.  Her labs were within normal limits but she was treated for possible gout with a Medrol Dosepak and she continued home medication Aleve.  Patient states that her pain is much better but she has still noted some intermittent pain again to the top portion of the right midfoot.  She does have ingrown toenails worse on the right and she had wondered if perhaps this had contributed to her pain.  She has not noted any drainage coming from the ingrown toenail.    Patient's PMR from outside medical facility reviewed and noted.    Past Medical History:   Past Medical History:   Diagnosis Date    Acid reflux     Allergic     Arthritis     Elevated cholesterol     History of medical problems knee replacement    Pneumonia 2021    Sinusitis      Past Surgical History:  Past Surgical History:   Procedure Laterality Date    CARPAL TUNNEL RELEASE       SECTION      HYSTERECTOMY      INNER EAR SURGERY      REPLACEMENT TOTAL KNEE       Social History:  reports that she has been smoking cigarettes. She has a 25.00 pack-year smoking history. She has never used smokeless tobacco. She reports that she does not drink alcohol and does not use drugs.    Family History: family history includes Cancer in her father;  "Heart disease in her father; Obesity in her father and mother; Vision loss in her maternal grandmother.      Allergies:  No Known Allergies  Medications:  Prior to Admission medications    Medication Sig Start Date End Date Taking? Authorizing Provider   albuterol sulfate  (90 Base) MCG/ACT inhaler TAKE 2 PUFFS BY MOUTH EVERY 4 HOURS AS NEEDED FOR WHEEZE 1/20/22  Yes Reyes Damon MD   amLODIPine (NORVASC) 10 MG tablet Take 1 tablet by mouth Daily. 4/12/23  Yes Jeny Alexander APRN   aspirin 81 MG chewable tablet Chew 1 tablet Daily.   Yes Abril Walsh MD   esomeprazole (nexIUM) 40 MG capsule TAKE 1 CAPSULE BY MOUTH ONCE DAILY IN THE MORNING BEFORE BREAKFAST 9/12/22  Yes Caroline Degroot   ezetimibe (ZETIA) 10 MG tablet Take 1 tablet by mouth Daily. 9/23/22  Yes Jeny Alexander APRN   fluticasone (FLONASE) 50 MCG/ACT nasal spray 2 sprays into the nostril(s) as directed by provider Daily.   Yes Abril Walsh MD   loratadine (CLARITIN) 10 MG tablet Take  by mouth.   Yes Abril Walsh MD   methylPREDNISolone (MEDROL) 4 MG dose pack Take as directed on package instructions. 5/10/23  Yes Jeny Alexander APRN   Naproxen Sodium (ALEVE PO) Take  by mouth.   Yes Abril Walsh MD   Omega-3 Fatty Acids (fish oil) 1000 MG capsule capsule Take 1 capsule by mouth Daily With Breakfast.   Yes Abril Walsh MD   Vitamin D, Cholecalciferol, 50 MCG (2000 UT) capsule Take 1 capsule by mouth Daily. 2/9/22  Yes Nidhi Fisher APRN       Objective     Vital Signs: /86 (BP Location: Left arm, Patient Position: Sitting, Cuff Size: Adult)   Pulse 88   Temp 97.3 °F (36.3 °C) (Infrared)   Resp 16   Ht 157.5 cm (62.01\")   Wt 90.2 kg (198 lb 12.8 oz)   SpO2 96%   BMI 36.35 kg/m²   Physical Exam  Vitals and nursing note reviewed.   Constitutional:       General: She is not in acute distress.     Appearance: She is not ill-appearing or toxic-appearing.   HENT:      " Head: Normocephalic and atraumatic.      Mouth/Throat:      Mouth: Mucous membranes are moist.      Pharynx: Oropharynx is clear.   Cardiovascular:      Rate and Rhythm: Normal rate and regular rhythm.      Pulses: Normal pulses.      Heart sounds: Normal heart sounds.   Pulmonary:      Effort: Pulmonary effort is normal.      Breath sounds: No wheezing, rhonchi or rales.   Abdominal:      General: Bowel sounds are normal. There is no distension.      Palpations: Abdomen is soft.      Tenderness: There is no abdominal tenderness.   Musculoskeletal:         General: No swelling or tenderness. Normal range of motion.      Cervical back: Normal range of motion and neck supple. No tenderness.   Skin:     General: Skin is warm and dry.      Findings: No erythema or rash.   Neurological:      General: No focal deficit present.      Mental Status: She is alert and oriented to person, place, and time.   Psychiatric:         Mood and Affect: Mood normal.         Behavior: Behavior normal.         Thought Content: Thought content normal.         Judgment: Judgment normal.     Results Reviewed:  Reviewed x-ray of the right foot from 5/10/2023 which showed no acute bony abnormality.  Labs from this date showed normal uric acid and CRP.    Assessment / Plan     Assessment/Plan:  Diagnoses and all orders for this visit:    1. Right foot pain (Primary)       Patient presents to the office today for 2-week follow-up regarding right foot pain.  She had denied any injuries to her foot but was complaining of pain to the top right midfoot.  She had indicated some significant swelling prior to her last appointment which had improved after ice and elevation.  She had an x-ray at her last appointment which showed no acute bony abnormalities.  She was treated for possible gout with Toradol/Kenalog injections in the office followed by a Medrol Dosepak.  Patient does take Aleve on a daily basis.  She does report improvement in her pain.  She  can continue Aleve as needed, patient is to call for any worsening symptoms or new symptoms.    Return if symptoms worsen or fail to improve, for keep July appt. unless patient needs to be seen sooner or acute issues arise.    I have discussed the patient results/orders and and plan/recommendation with them at today's visit.      Jeny Alexander, BELEM   05/24/2023    Answers submitted by the patient for this visit:  Other (Submitted on 5/19/2023)  Please describe your symptoms.: foot pain  Have you had these symptoms before?: Yes  How long have you been having these symptoms?: Greater than 2 weeks  Primary Reason for Visit (Submitted on 5/19/2023)  What is the primary reason for your visit?: Other

## 2023-06-14 ENCOUNTER — OFFICE VISIT (OUTPATIENT)
Dept: GASTROENTEROLOGY | Facility: CLINIC | Age: 68
End: 2023-06-14
Payer: MEDICARE

## 2023-06-14 VITALS
OXYGEN SATURATION: 98 % | WEIGHT: 198 LBS | TEMPERATURE: 97 F | HEART RATE: 92 BPM | SYSTOLIC BLOOD PRESSURE: 124 MMHG | BODY MASS INDEX: 35.08 KG/M2 | DIASTOLIC BLOOD PRESSURE: 74 MMHG | HEIGHT: 63 IN

## 2023-06-14 DIAGNOSIS — R19.5 POSITIVE COLORECTAL CANCER SCREENING USING DNA-BASED STOOL TEST: Primary | ICD-10-CM

## 2023-06-14 PROCEDURE — 3078F DIAST BP <80 MM HG: CPT | Performed by: NURSE PRACTITIONER

## 2023-06-14 PROCEDURE — 99214 OFFICE O/P EST MOD 30 MIN: CPT | Performed by: NURSE PRACTITIONER

## 2023-06-14 PROCEDURE — 3074F SYST BP LT 130 MM HG: CPT | Performed by: NURSE PRACTITIONER

## 2023-06-14 NOTE — PROGRESS NOTES
Chief Complaint   Patient presents with    Colonoscopy     Positive cologuard       PCP: Jeny Alexander APRN  REFER: Jeny Alexander APRN    Subjective     HPI    She presents to office with positive cologaurd test from PCP office May 2023.     She denies change in bowels.  Bowels described as moving without difficulty, no change. No abdominal pain.  No BRBPR.  No melena.  Weight is stable.  She has undergone previous colonoscopy evaluation with removal of polyps.  There is not a family history of colon cancer or colon polyps.  Cologuard - Stool, Per Rectum (2023 06:00)     Dr Pierre 2018-[facility] - polyps removed    Past Medical History:   Diagnosis Date    Acid reflux     Allergic     Arthritis     Elevated cholesterol     History of medical problems knee replacement    Pneumonia 2021    Sinusitis        Past Surgical History:   Procedure Laterality Date    CARPAL TUNNEL RELEASE       SECTION      HYSTERECTOMY      INNER EAR SURGERY      REPLACEMENT TOTAL KNEE         Outpatient Medications Marked as Taking for the 23 encounter (Office Visit) with Keagan Ramirez APRN   Medication Sig Dispense Refill    albuterol sulfate  (90 Base) MCG/ACT inhaler TAKE 2 PUFFS BY MOUTH EVERY 4 HOURS AS NEEDED FOR WHEEZE 18 g 2    amLODIPine (NORVASC) 10 MG tablet Take 1 tablet by mouth Daily. 30 tablet 3    aspirin 81 MG chewable tablet Chew 1 tablet Daily.      esomeprazole (nexIUM) 40 MG capsule TAKE 1 CAPSULE BY MOUTH ONCE DAILY IN THE MORNING BEFORE BREAKFAST 90 capsule 3    ezetimibe (ZETIA) 10 MG tablet Take 1 tablet by mouth Daily. 90 tablet 3    fluticasone (FLONASE) 50 MCG/ACT nasal spray 2 sprays into the nostril(s) as directed by provider Daily.      loratadine (CLARITIN) 10 MG tablet Take  by mouth.      Naproxen Sodium (ALEVE PO) Take  by mouth.      Omega-3 Fatty Acids (fish oil) 1000 MG capsule capsule Take 1 capsule by mouth Daily With Breakfast.      Vitamin D,  "Cholecalciferol, 50 MCG (2000 UT) capsule Take 1 capsule by mouth Daily. 30 capsule 5       No Known Allergies    Social History     Socioeconomic History    Marital status:    Tobacco Use    Smoking status: Every Day     Packs/day: 0.50     Years: 50.00     Pack years: 25.00     Types: Cigarettes    Smokeless tobacco: Never   Vaping Use    Vaping Use: Never used   Substance and Sexual Activity    Alcohol use: No    Drug use: No    Sexual activity: Not Currently     Partners: Male     Birth control/protection: Surgical       Review of Systems   Constitutional:  Negative for fever and unexpected weight change.   HENT:  Negative for trouble swallowing.    Respiratory:  Negative for shortness of breath.    Cardiovascular:  Negative for chest pain.   Gastrointestinal:  Negative for abdominal pain and anal bleeding.     Objective     Vitals:    06/14/23 1223   BP: 124/74   Pulse: 92   Temp: 97 °F (36.1 °C)   SpO2: 98%   Weight: 89.8 kg (198 lb)   Height: 158.8 cm (62.5\")     Body mass index is 35.64 kg/m².    Physical Exam  Constitutional:       Appearance: Normal appearance. She is well-developed.   Eyes:      General: No scleral icterus.  Cardiovascular:      Heart sounds: Normal heart sounds. No murmur heard.  Pulmonary:      Effort: Pulmonary effort is normal.   Abdominal:      General: Bowel sounds are normal. There is no distension.      Palpations: Abdomen is soft.      Tenderness: There is no abdominal tenderness. There is no guarding.   Skin:     General: Skin is warm and dry.      Coloration: Skin is not jaundiced.   Neurological:      Mental Status: She is alert.   Psychiatric:         Behavior: Behavior is cooperative.       Imaging Results (Most Recent)       None            Body mass index is 35.64 kg/m².    Assessment & Plan     Diagnoses and all orders for this visit:    1. Positive colorectal cancer screening using DNA-based stool test (Primary)  -     Case Request; Standing  -     Implement " Anesthesia Orders Day of Procedure; Standing  -     Obtain Informed Consent; Standing        COLONOSCOPY WITH ANESTHESIA (N/A)    Patient is to continue all blood pressure and cardiac medications prior to procedure and has been advised to take medications morning of procedure   Pt verbalized understanding    Advised pt to stop use of NSAIDs, Fish Oil, and MV 5 days prior to procedure, per Dr Cabral protocol.  Tylenol based products are ok to take.  Pt verbalized understanding.     I have explained a positive cologuard indicates the presence of DNA/hgb in stool that is associated with colon polyps or colon cancer.  There is a chance the test is a false positive with that chance being higher for people over the age of 65. This is due to normal changes in DNA associated with getting older (AGA).  Due to the positive result of the Cologuard I recommend pt undergoing colonoscopy.  Colonoscopy remains the gold standard for detection of colon polyps/colon cancer.      All risks, benefits, alternatives, and indications of colonoscopy procedure have been discussed with the patient. Risks to include perforation of the colon requiring possible surgery or colostomy, risk of bleeding from biopsies or removal of colon tissue, possibility of missing a colon polyp or cancer, or adverse drug reaction.  Benefits to include the diagnosis and management of disease of the colon and rectum.  Pt verbalizes understanding and agrees to proceed with procedure.              Keagan Ramirez, APRN  06/14/23            There are no Patient Instructions on file for this visit.

## 2023-06-15 PROBLEM — R19.5 POSITIVE COLORECTAL CANCER SCREENING USING DNA-BASED STOOL TEST: Status: ACTIVE | Noted: 2023-06-15

## 2023-08-06 NOTE — PROGRESS NOTES
Uric acid and inflammatory marker are normal, but these can sometimes be normal even if having an acute episode of gout so I would continue the medications she was prescribed and treat as though she has gout. Please have her call if she is not improving. 8

## 2023-09-28 RX ORDER — ESOMEPRAZOLE MAGNESIUM 40 MG/1
CAPSULE, DELAYED RELEASE ORAL
Qty: 90 CAPSULE | Refills: 3 | Status: SHIPPED | OUTPATIENT
Start: 2023-09-28

## 2023-09-28 RX ORDER — EZETIMIBE 10 MG/1
10 TABLET ORAL DAILY
Qty: 90 TABLET | Refills: 3 | Status: SHIPPED | OUTPATIENT
Start: 2023-09-28

## 2023-10-18 ENCOUNTER — OFFICE VISIT (OUTPATIENT)
Dept: INTERNAL MEDICINE | Facility: CLINIC | Age: 68
End: 2023-10-18
Payer: MEDICARE

## 2023-10-18 VITALS
SYSTOLIC BLOOD PRESSURE: 146 MMHG | TEMPERATURE: 97.1 F | OXYGEN SATURATION: 98 % | WEIGHT: 197.4 LBS | HEART RATE: 79 BPM | BODY MASS INDEX: 34.98 KG/M2 | DIASTOLIC BLOOD PRESSURE: 84 MMHG | HEIGHT: 63 IN

## 2023-10-18 DIAGNOSIS — R73.03 PREDIABETES: ICD-10-CM

## 2023-10-18 DIAGNOSIS — M12.9 ARTHROPATHY, MULTIPLE SITES: ICD-10-CM

## 2023-10-18 DIAGNOSIS — Z87.891 PERSONAL HISTORY OF NICOTINE DEPENDENCE: ICD-10-CM

## 2023-10-18 DIAGNOSIS — I10 HYPERTENSION, BENIGN: Primary | ICD-10-CM

## 2023-10-18 DIAGNOSIS — L98.9 SKIN LESIONS, GENERALIZED: ICD-10-CM

## 2023-10-18 RX ORDER — NAPROXEN SODIUM 220 MG
500 TABLET ORAL 2 TIMES DAILY PRN
Start: 2023-10-18

## 2023-10-18 NOTE — PROGRESS NOTES
Procedure   Cryotherapy, Skin Lesion    Date/Time: 10/18/2023 12:45 PM    Performed by: Jeny Alexander APRN  Authorized by: Jeny Alexander APRN  Preparation: Patient was prepped and draped in the usual sterile fashion.  Local anesthesia used: no    Anesthesia:  Local anesthesia used: no    Sedation:  Patient sedated: no    Patient tolerance: patient tolerated the procedure well with no immediate complications  Comments: Skin lesions to left forearm and right posterior knee.

## 2023-10-18 NOTE — PROGRESS NOTES
Subjective     Chief Complaint:  Arthritis    HPI:  Patient presents to the office today for a follow-up regarding hypertension management as well as prediabetes.  She does complain of generalized arthritis which seems to be worse with weather changes recently.  She also has some complaints of some skin lesions. Please see assessment and plan below.     Patient's PMR from outside medical facility reviewed and noted.    Past Medical History:   Past Medical History:   Diagnosis Date    Acid reflux     Allergic     Arthritis     Elevated cholesterol     History of medical problems knee replacement    History of transfusion     Hypertension     Pneumonia 2021    Sinusitis      Past Surgical History:  Past Surgical History:   Procedure Laterality Date    APPENDECTOMY   with hysterectomy    CARPAL TUNNEL RELEASE       SECTION      COLONOSCOPY      COLONOSCOPY N/A 2023    Procedure: COLONOSCOPY WITH ANESTHESIA;  Surgeon: eJremy Cabral DO;  Location: [facility] ENDOSCOPY;  Service: Gastroenterology;  Laterality: N/A;  pre screen  post polyps  Jeny Alexander    HYSTERECTOMY      INNER EAR SURGERY      REPLACEMENT TOTAL KNEE       Social History:  reports that she has been smoking cigarettes. She has a 25.00 pack-year smoking history. She has never used smokeless tobacco. She reports that she does not drink alcohol and does not use drugs.    Family History: family history includes Cancer in her father; Heart disease in her father; Obesity in her father and mother; Vision loss in her maternal grandmother.      Allergies:  No Known Allergies  Medications:  Prior to Admission medications    Medication Sig Start Date End Date Taking? Authorizing Provider   albuterol sulfate  (90 Base) MCG/ACT inhaler TAKE 2 PUFFS BY MOUTH EVERY 4 HOURS AS NEEDED FOR WHEEZE 22  Yes Reyes Damon MD   amLODIPine (NORVASC) 10 MG tablet Take 1 tablet by mouth Daily. 23  Yes Jeny Alexander  "BELEM   aspirin 81 MG chewable tablet Chew 1 tablet Daily.   Yes Abril Walsh MD   esomeprazole (nexIUM) 40 MG capsule TAKE 1 CAPSULE BY MOUTH ONCE DAILY IN THE MORNING BEFORE BREAKFAST 9/28/23  Yes Irma Barber APRN   ezetimibe (ZETIA) 10 MG tablet Take 1 tablet by mouth Daily. 9/28/23  Yes Irma Barber APRN   fluticasone (FLONASE) 50 MCG/ACT nasal spray 2 sprays into the nostril(s) as directed by provider Daily.   Yes Abril Walsh MD   loratadine (CLARITIN) 10 MG tablet Take  by mouth.   Yes Abril Walsh MD   Naproxen Sodium (ALEVE PO) Take  by mouth.   Yes Abril Walsh MD   Omega-3 Fatty Acids (fish oil) 1000 MG capsule capsule Take 1 capsule by mouth Daily With Breakfast.   Yes Abril Walsh MD   Vitamin D, Cholecalciferol, 50 MCG (2000 UT) capsule Take 1 capsule by mouth Daily. 2/9/22  Yes Nidhi Fisher APRN       Objective     Vital Signs: /84 (BP Location: Left arm, Patient Position: Sitting, Cuff Size: Adult)   Pulse 79   Temp 97.1 °F (36.2 °C) (Infrared)   Ht 158.8 cm (62.52\")   Wt 89.5 kg (197 lb 6.4 oz)   SpO2 98%   BMI 35.51 kg/m²   Physical Exam  Vitals and nursing note reviewed.   Constitutional:       General: She is not in acute distress.     Appearance: She is obese. She is not ill-appearing or toxic-appearing.   HENT:      Head: Normocephalic and atraumatic.      Mouth/Throat:      Mouth: Mucous membranes are moist.      Pharynx: Oropharynx is clear.   Cardiovascular:      Rate and Rhythm: Normal rate and regular rhythm.      Pulses: Normal pulses.      Heart sounds: Normal heart sounds.   Pulmonary:      Effort: Pulmonary effort is normal.      Breath sounds: No wheezing, rhonchi or rales.   Abdominal:      General: Bowel sounds are normal. There is no distension.      Palpations: Abdomen is soft.      Tenderness: There is no abdominal tenderness.   Musculoskeletal:         General: No swelling or tenderness. Normal range of " motion.      Cervical back: Normal range of motion and neck supple. No tenderness.   Skin:     General: Skin is warm and dry.      Findings: Lesion (generalized skin lesions which have appearance of AK. Lesions to the right posterior knee and left forearm are more raised- cryotherapy performed on these today) present. No erythema or rash.   Neurological:      General: No focal deficit present.      Mental Status: She is alert and oriented to person, place, and time.   Psychiatric:         Mood and Affect: Mood normal.         Behavior: Behavior normal.         Thought Content: Thought content normal.         Judgment: Judgment normal.       Results Reviewed:  Comprehensive Metabolic Panel (04/10/2023 09:50)     Assessment / Plan     Assessment/Plan:  Diagnoses and all orders for this visit:    1. Hypertension, benign (Primary)  -     Basic metabolic panel    2. Arthropathy, multiple sites  -     naproxen sodium (Aleve) 220 MG tablet; Take 2.5 tablets by mouth 2 (Two) Times a Day As Needed (Arthritis pain).    3. Skin lesions, generalized  -     Cryotherapy, Skin Lesion    4. Prediabetes    5. Personal history of nicotine dependence  -     CT Chest Low Dose Wo; Future       Patient presents to the office today for a 3-month follow-up.  She was last seen in July with complaints of ongoing right foot pain.  She had been treated for this pain with a course of steroids and continued home anti-inflammatories without much improvement of her pain.  With ongoing pain, we did perform MRI as her x-ray was negative.  This did show possible stress fractures at the second and third metatarsals.  She has been seen by the orthopedic [place] for nonsurgical management.  She did wear a walking boot for 4 weeks per her report.  She indicates that her pain is improving to the right foot.  Unfortunately, she has noted generalized joint pain, which she attributes to arthritis.  She notes this in her hips, wrists, knees and ankles.  She  feels that both sides are affected equally.  She does complain of pain which is worse with weather changes.  She does take naproxen on a fairly regular basis but she is unsure of the dosage, just knows she takes 1 pill as needed.  We did discuss increasing this dose versus trialing a different anti-inflammatory such as meloxicam.  The patient would like to increase naproxen dosage first and we did discuss that she could take up to 500 mg twice daily as needed.  She notes no swelling in her joints, no redness or skin discoloration of joints.  She has no family history of rheumatoid arthritis or other autoimmune diseases that she is aware of.  We will assess a BMP today given increase in naproxen dosage to assess stability of renal function.    Blood pressure is slightly elevated in the office today at 146/84.  She has not been monitoring her blood pressure as routinely at home in the last month as she states she has been very busy but typically when she checks this at home she states that her blood pressure is usually 120s over 70s/80s.  She denies any chest pain, shortness of breath, dizziness/lightheadedness or headaches.  I have asked her to monitor this on a more frequent basis at home.  She does note that her blood pressure is typically always elevated at the doctor's office so likely has an element of whitecoat syndrome.  For now, we will continue amlodipine at 10 mg daily.    Last hemoglobin A1c assessed in July was 6.2, which has been fairly consistent with previous.  The patient tells me in the last 3 months she has made several changes to her diet.  She is eating more fresh vegetables and has tried to increase healthy fat intake.  We will continue to monitor her hemoglobin A1c at least every 6 months for now.  We also discussed that continuing regular physical activity can help with this as well.    The patient does complain of generalized skin lesions which she states she sometimes removes herself at home  with a razor blade.  She has been getting a skin lesion on the left forearm caught on her clothes and on objects so we did perform cryotherapy on this lesion today.  Also performed cryotherapy on a lesion behind the right knee.  These lesions do have an actinic keratosis appearance.  I did encourage her to go to the dermatologist for a full skin evaluation as she has been seen by dermatology in the past.    The patient is a current smoker and is agreeable to lung cancer screening with low-dose chest CT although she does request to not have this performed until after the holidays.    Return in about 6 months (around 4/18/2024) for Medicare Wellness. unless patient needs to be seen sooner or acute issues arise.    I have discussed the patient results/orders and and plan/recommendation with them at today's visit.      Jeny Alexander, BELEM   10/18/2023    Answers submitted by the patient for this visit:  Primary Reason for Visit (Submitted on 10/17/2023)  What is the primary reason for your visit?: Physical

## 2023-10-19 DIAGNOSIS — E87.5 HYPERKALEMIA: Primary | ICD-10-CM

## 2023-10-19 LAB
BUN SERPL-MCNC: 11 MG/DL (ref 8–23)
BUN/CREAT SERPL: 15.1 (ref 7–25)
CALCIUM SERPL-MCNC: 10.3 MG/DL (ref 8.6–10.5)
CHLORIDE SERPL-SCNC: 104 MMOL/L (ref 98–107)
CO2 SERPL-SCNC: 29.7 MMOL/L (ref 22–29)
CREAT SERPL-MCNC: 0.73 MG/DL (ref 0.57–1)
EGFRCR SERPLBLD CKD-EPI 2021: 88.6 ML/MIN/1.73
GLUCOSE SERPL-MCNC: 99 MG/DL (ref 65–99)
POTASSIUM SERPL-SCNC: 5.9 MMOL/L (ref 3.5–5.2)
SODIUM SERPL-SCNC: 141 MMOL/L (ref 136–145)

## 2023-10-19 NOTE — PROGRESS NOTES
Potassium level is high, which she has had in the past also. I do not see any medications on her list which typically cause this. If she is taking a multivitamin containing calcium I would suggest she stop that. Encourage patient to drink at least 64 oz. Of water per day. Would limit high potassium foods such as citrus fruits/bananas, potatoes, beans, leafy greens. Please have patient come back to the office in 1 week to have this rechecked.

## 2023-10-23 ENCOUNTER — TELEPHONE (OUTPATIENT)
Dept: INTERNAL MEDICINE | Facility: CLINIC | Age: 68
End: 2023-10-23

## 2023-10-23 NOTE — TELEPHONE ENCOUNTER
Caller: Julia Diaz    Relationship: Self    Best call back number: [phone]     What is the best time to reach you: ANY    Who are you requesting to speak with (clinical staff, provider,  specific staff member): CLINICAL    Do you know the name of the person who called: SELF    What was the call regarding: WANTS TO MAKE SURE THERE IS AN ACTIVE ORDER TO COME BACK IN WITHOUT BEING SCHEDULED AND HAVE POTASIUM LEVEL RECHECKED    Is it okay if the provider responds through MyChart: YES OR CALL BACK

## 2024-01-02 ENCOUNTER — HOSPITAL ENCOUNTER (OUTPATIENT)
Dept: CT IMAGING | Facility: HOSPITAL | Age: 69
Discharge: HOME | End: 2024-01-02
Admitting: NURSE PRACTITIONER
Payer: MEDICARE

## 2024-01-02 DIAGNOSIS — Z87.891 PERSONAL HISTORY OF NICOTINE DEPENDENCE: ICD-10-CM

## 2024-01-02 PROCEDURE — 71271 CT THORAX LUNG CANCER SCR C-: CPT

## 2024-04-24 ENCOUNTER — OFFICE VISIT (OUTPATIENT)
Dept: INTERNAL MEDICINE | Facility: CLINIC | Age: 69
End: 2024-04-24
Payer: MEDICARE

## 2024-04-24 VITALS
OXYGEN SATURATION: 97 % | BODY MASS INDEX: 35.86 KG/M2 | WEIGHT: 202.4 LBS | TEMPERATURE: 97.6 F | HEIGHT: 63 IN | HEART RATE: 93 BPM | SYSTOLIC BLOOD PRESSURE: 132 MMHG | DIASTOLIC BLOOD PRESSURE: 84 MMHG

## 2024-04-24 DIAGNOSIS — I10 HYPERTENSION, UNSPECIFIED TYPE: ICD-10-CM

## 2024-04-24 DIAGNOSIS — E78.5 HYPERLIPIDEMIA, UNSPECIFIED HYPERLIPIDEMIA TYPE: ICD-10-CM

## 2024-04-24 DIAGNOSIS — E03.9 HYPOTHYROIDISM, UNSPECIFIED TYPE: ICD-10-CM

## 2024-04-24 DIAGNOSIS — R73.03 PREDIABETES: ICD-10-CM

## 2024-04-24 DIAGNOSIS — Z12.31 BREAST CANCER SCREENING BY MAMMOGRAM: ICD-10-CM

## 2024-04-24 DIAGNOSIS — Z00.00 ENCOUNTER FOR SUBSEQUENT ANNUAL WELLNESS VISIT (AWV) IN MEDICARE PATIENT: Primary | ICD-10-CM

## 2024-04-24 DIAGNOSIS — Z72.0 TOBACCO USE: ICD-10-CM

## 2024-04-24 PROCEDURE — 3079F DIAST BP 80-89 MM HG: CPT | Performed by: NURSE PRACTITIONER

## 2024-04-24 PROCEDURE — 1160F RVW MEDS BY RX/DR IN RCRD: CPT | Performed by: NURSE PRACTITIONER

## 2024-04-24 PROCEDURE — 3075F SYST BP GE 130 - 139MM HG: CPT | Performed by: NURSE PRACTITIONER

## 2024-04-24 PROCEDURE — 1170F FXNL STATUS ASSESSED: CPT | Performed by: NURSE PRACTITIONER

## 2024-04-24 PROCEDURE — 1159F MED LIST DOCD IN RCRD: CPT | Performed by: NURSE PRACTITIONER

## 2024-04-24 PROCEDURE — G0439 PPPS, SUBSEQ VISIT: HCPCS | Performed by: NURSE PRACTITIONER

## 2024-04-24 NOTE — PROGRESS NOTES
The ABCs of the Annual Wellness Visit  Subsequent Medicare Wellness Visit    Subjective      Julia Diaz is a 69 y.o. female who presents for a Subsequent Medicare Wellness Visit.    The following portions of the patient's history were reviewed and   updated as appropriate: allergies, current medications, past family history, past medical history, past social history, past surgical history, and problem list.    Compared to one year ago, the patient feels her physical   health is better.    Compared to one year ago, the patient feels her mental   health is the same.    Recent Hospitalizations:  She was not admitted to the hospital during the last year.       Current Medical Providers:  Patient Care Team:  Jeny Alexander APRN as PCP - General (Nurse Practitioner)    Outpatient Medications Prior to Visit   Medication Sig Dispense Refill    albuterol sulfate  (90 Base) MCG/ACT inhaler TAKE 2 PUFFS BY MOUTH EVERY 4 HOURS AS NEEDED FOR WHEEZE 18 g 2    amLODIPine (NORVASC) 10 MG tablet Take 1 tablet by mouth Daily. 90 tablet 3    aspirin 81 MG chewable tablet Chew 1 tablet Daily.      esomeprazole (nexIUM) 40 MG capsule TAKE 1 CAPSULE BY MOUTH ONCE DAILY IN THE MORNING BEFORE BREAKFAST 90 capsule 3    ezetimibe (ZETIA) 10 MG tablet Take 1 tablet by mouth Daily. 90 tablet 3    fluticasone (FLONASE) 50 MCG/ACT nasal spray 2 sprays into the nostril(s) as directed by provider Daily.      loratadine (CLARITIN) 10 MG tablet Take  by mouth.      naproxen sodium (Aleve) 220 MG tablet Take 2.5 tablets by mouth 2 (Two) Times a Day As Needed (Arthritis pain).      Omega-3 Fatty Acids (fish oil) 1000 MG capsule capsule Take 1 capsule by mouth Daily With Breakfast.      Vitamin D, Cholecalciferol, 50 MCG (2000 UT) capsule Take 1 capsule by mouth Daily. 30 capsule 5     No facility-administered medications prior to visit.       No opioid medication identified on active medication list. I have reviewed chart for other  "potential  high risk medication/s and harmful drug interactions in the elderly.        Aspirin is on active medication list.  Aspirin use is not indicated in this patient, however I do not see any harm in her continuing to take this.  Risk of harm does not outweigh the benefit. .      Patient Active Problem List   Diagnosis    Abscess, perirectal    Anxiety    Arthritis of knee    Arthropathy, multiple sites    Gastroesophageal reflux disease without esophagitis    Mixed hyperlipidemia    Hypertension, benign    Impaired fasting glucose    Insomnia, controlled    Tobacco use    Vitamin D deficiency    Prediabetes    Class 2 severe obesity due to excess calories with serious comorbidity and body mass index (BMI) of 35.0 to 35.9 in adult    Positive colorectal cancer screening using DNA-based stool test     Advance Care Planning   Advance Care Planning     Advance Directive is not on file.  ACP discussion was held with the patient during this visit. Patient does not have an advance directive, declines further assistance.     Objective    Vitals:    04/24/24 1054   BP: 132/84   BP Location: Left arm   Patient Position: Sitting   Cuff Size: Adult   Pulse: 93   Temp: 97.6 °F (36.4 °C)   TempSrc: Temporal   SpO2: 97%   Weight: 91.8 kg (202 lb 6.4 oz)   Height: 158.8 cm (62.52\")   PainSc: 0-No pain     Estimated body mass index is 36.41 kg/m² as calculated from the following:    Height as of this encounter: 158.8 cm (62.52\").    Weight as of this encounter: 91.8 kg (202 lb 6.4 oz).    Class 2 Severe Obesity (BMI >=35 and <=39.9). Obesity-related health conditions include the following: hypertension, dyslipidemias, GERD, and osteoarthritis. Obesity is unchanged. BMI is is above average; BMI management plan is completed. We discussed portion control and increasing exercise.      Does the patient have evidence of cognitive impairment?   No            HEALTH RISK ASSESSMENT    Smoking Status:  Social History     Tobacco Use "   Smoking Status Every Day    Current packs/day: 0.50    Average packs/day: 0.5 packs/day for 58.3 years (29.2 ttl pk-yrs)    Types: Cigarettes    Start date:    Smokeless Tobacco Never     Alcohol Consumption:  Social History     Substance and Sexual Activity   Alcohol Use No     Fall Risk Screen:    KETTYADI Fall Risk Assessment was completed, and patient is at LOW risk for falls.Assessment completed on:2024    Depression Screenin/24/2024    11:00 AM   PHQ-2/PHQ-9 Depression Screening   Little Interest or Pleasure in Doing Things 0-->not at all   Feeling Down, Depressed or Hopeless 0-->not at all   PHQ-9: Brief Depression Severity Measure Score 0       Health Habits and Functional and Cognitive Screenin/24/2024    10:59 AM   Functional & Cognitive Status   Do you have difficulty preparing food and eating? No   Do you have difficulty bathing yourself, getting dressed or grooming yourself? No   Do you have difficulty using the toilet? No   Do you have difficulty moving around from place to place? No   Do you have trouble with steps or getting out of a bed or a chair? No   Current Diet Well Balanced Diet   Dental Exam Not up to date   Eye Exam Up to date   Exercise (times per week) 0 times per week   Current Exercises Include No Regular Exercise   Do you need help using the phone?  No   Are you deaf or do you have serious difficulty hearing?  No   Do you need help to go to places out of walking distance? No   Do you need help shopping? No   Do you need help preparing meals?  No   Do you need help with housework?  No   Do you need help with laundry? No   Do you need help taking your medications? No   Do you need help managing money? No   Do you ever drive or ride in a car without wearing a seat belt? No   Have you felt unusual stress, anger or loneliness in the last month? No   Who do you live with? Spouse   If you need help, do you have trouble finding someone available to you? No   Have you  been bothered in the last four weeks by sexual problems? No   Do you have difficulty concentrating, remembering or making decisions? No       Age-appropriate Screening Schedule:  Refer to the list below for future screening recommendations based on patient's age, sex and/or medical conditions. Orders for these recommended tests are listed in the plan section. The patient has been provided with a written plan.    Health Maintenance   Topic Date Due    HEMOGLOBIN A1C  01/12/2024    LIPID PANEL  04/10/2024    BMI FOLLOWUP  04/12/2024    ZOSTER VACCINE (1 of 2) 04/24/2024 (Originally 2/13/2003)    COVID-19 Vaccine (1 - 2023-24 season) 04/26/2024 (Originally 9/1/2023)    RSV Vaccine - Adults (1 - 1-dose 60+ series) 04/24/2025 (Originally 2/13/2013)    TDAP/TD VACCINES (1 - Tdap) 04/24/2025 (Originally [birth year]/1972)    Pneumococcal Vaccine 65+ (1 of 2 - PCV) 10/01/2025 (Originally 2/13/1961)    INFLUENZA VACCINE  08/01/2024    LUNG CANCER SCREENING  01/02/2025    ANNUAL WELLNESS VISIT  04/24/2025    MAMMOGRAM  06/21/2025    DXA SCAN  06/21/2025    COLORECTAL CANCER SCREENING  06/23/2028    HEPATITIS C SCREENING  Completed    DIABETIC FOOT EXAM  Discontinued    DIABETIC EYE EXAM  Discontinued    URINE MICROALBUMIN  Discontinued                  CMS Preventative Services Quick Reference  Risk Factors Identified During Encounter:    Immunizations Discussed/Encouraged: Shingrix, COVID19, and RSV (Respiratory Syncytial Virus)  Tobacco Use/Dependance Risk   Julia Diaz  reports that she has been smoking cigarettes. She started smoking about 58 years ago. She has a 29.2 pack-year smoking history. She has never used smokeless tobacco. I have educated her on the risk of diseases from using tobacco products such as cancer, COPD, and heart disease.     I advised her to quit and she is not willing to quit.    I spent 3  minutes counseling the patient.    The above risks/problems have been discussed with the patient.  Pertinent  information has been shared with the patient in the After Visit Summary.    Diagnoses and all orders for this visit:    1. Encounter for subsequent annual wellness visit (AWV) in Medicare patient (Primary)    2. Hypertension, unspecified type  -     CBC & Differential  -     Comprehensive Metabolic Panel  -     Urinalysis With Microscopic - Urine, Clean Catch    3. Hyperlipidemia, unspecified hyperlipidemia type  -     Lipid Panel    4. Hypothyroidism, unspecified type  -     TSH Rfx On Abnormal To Free T4    5. Prediabetes  -     Hemoglobin A1c    6. Tobacco use    7. Breast cancer screening by mammogram  -     Mammo Screening Digital Tomosynthesis Bilateral With CAD; Future      Patient presents to the office today for subsequent Medicare wellness exam.  Indicates overall she is feeling better in general.  She will have fasting labs drawn today as above.  Will follow-up with these results and make changes to plan of care as necessary.  Blood pressure is well-controlled in the office today at 132/84.  Patient indicates adequate control at home as well with systolic readings typically between 110 and 130.  She denies any chest pain, shortness of breath, dizziness/lightheadedness or headaches.  Continue amlodipine 10 mg daily for now and will follow-up with lab results once available.    Patient with history of prediabetes.  Her hemoglobin A1c has never gotten above 6.3 based on review of previous labs.  Will reassess A1c today.    Patient is up-to-date on breast cancer screening.  Last mammogram performed in June 2023 was negative.  Will repeat mammogram in June.  Last colonoscopy was in June 2023 as well.  This did show multiple polyps with recommendations to repeat in 5 years.  Last DEXA scan in June 2023 did show osteopenia with improvement of T-score compared to previous.  Continue vitamin D supplementation.  Unsure if patient is taking calcium or multivitamin containing calcium at this time.  Will plan to  repeat DEXA scan in 2025.    Patient reports needing to find a dentist soon.  She does not have dental insurance so has been putting this off.  She does report being seen by an eye doctor within the last year.  She does wear contacts and is compliant with these.    Patient continues to smoke cigarettes on a daily basis.  She is not interested in quitting at this time.  She did have lung cancer screening with low-dose chest CT in January 2024.  No lung nodules or abnormalities noted.  Plan to repeat in January 2025.    Patient declines vaccines at this time.    Follow Up:   Next Medicare Wellness visit to be scheduled in 1 year.      An After Visit Summary and PPPS were made available to the patient.

## 2024-04-25 LAB
ALBUMIN SERPL-MCNC: 4.5 G/DL (ref 3.5–5.2)
ALBUMIN/GLOB SERPL: 2 G/DL
ALP SERPL-CCNC: 125 U/L (ref 39–117)
ALT SERPL-CCNC: 18 U/L (ref 1–33)
APPEARANCE UR: CLEAR
AST SERPL-CCNC: 17 U/L (ref 1–32)
BACTERIA #/AREA URNS HPF: ABNORMAL /HPF
BASOPHILS # BLD AUTO: 0.06 10*3/MM3 (ref 0–0.2)
BASOPHILS NFR BLD AUTO: 0.6 % (ref 0–1.5)
BILIRUB SERPL-MCNC: 0.2 MG/DL (ref 0–1.2)
BILIRUB UR QL STRIP: NEGATIVE
BUN SERPL-MCNC: 10 MG/DL (ref 8–23)
BUN/CREAT SERPL: 12.3 (ref 7–25)
CALCIUM SERPL-MCNC: 9.8 MG/DL (ref 8.6–10.5)
CASTS URNS MICRO: ABNORMAL
CHLORIDE SERPL-SCNC: 101 MMOL/L (ref 98–107)
CHOLEST SERPL-MCNC: 215 MG/DL (ref 0–200)
CO2 SERPL-SCNC: 24.7 MMOL/L (ref 22–29)
COLOR UR: YELLOW
CREAT SERPL-MCNC: 0.81 MG/DL (ref 0.57–1)
EGFRCR SERPLBLD CKD-EPI 2021: 77.7 ML/MIN/1.73
EOSINOPHIL # BLD AUTO: 0.14 10*3/MM3 (ref 0–0.4)
EOSINOPHIL NFR BLD AUTO: 1.3 % (ref 0.3–6.2)
EPI CELLS #/AREA URNS HPF: ABNORMAL /HPF
ERYTHROCYTE [DISTWIDTH] IN BLOOD BY AUTOMATED COUNT: 13.9 % (ref 12.3–15.4)
GLOBULIN SER CALC-MCNC: 2.3 GM/DL
GLUCOSE SERPL-MCNC: 103 MG/DL (ref 65–99)
GLUCOSE UR QL STRIP: NEGATIVE
HBA1C MFR BLD: 6.4 % (ref 4.8–5.6)
HCT VFR BLD AUTO: 49.4 % (ref 34–46.6)
HDLC SERPL-MCNC: 42 MG/DL (ref 40–60)
HGB BLD-MCNC: 15.9 G/DL (ref 12–15.9)
HGB UR QL STRIP: NEGATIVE
IMM GRANULOCYTES # BLD AUTO: 0.07 10*3/MM3 (ref 0–0.05)
IMM GRANULOCYTES NFR BLD AUTO: 0.7 % (ref 0–0.5)
KETONES UR QL STRIP: NEGATIVE
LDLC SERPL CALC-MCNC: 136 MG/DL (ref 0–100)
LEUKOCYTE ESTERASE UR QL STRIP: ABNORMAL
LYMPHOCYTES # BLD AUTO: 2.74 10*3/MM3 (ref 0.7–3.1)
LYMPHOCYTES NFR BLD AUTO: 25.7 % (ref 19.6–45.3)
MCH RBC QN AUTO: 29.2 PG (ref 26.6–33)
MCHC RBC AUTO-ENTMCNC: 32.2 G/DL (ref 31.5–35.7)
MCV RBC AUTO: 90.6 FL (ref 79–97)
MONOCYTES # BLD AUTO: 0.67 10*3/MM3 (ref 0.1–0.9)
MONOCYTES NFR BLD AUTO: 6.3 % (ref 5–12)
NEUTROPHILS # BLD AUTO: 7 10*3/MM3 (ref 1.7–7)
NEUTROPHILS NFR BLD AUTO: 65.4 % (ref 42.7–76)
NITRITE UR QL STRIP: NEGATIVE
NRBC BLD AUTO-RTO: 0 /100 WBC (ref 0–0.2)
PH UR STRIP: 5.5 [PH] (ref 5–8)
PLATELET # BLD AUTO: 383 10*3/MM3 (ref 140–450)
POTASSIUM SERPL-SCNC: 4.6 MMOL/L (ref 3.5–5.2)
PROT SERPL-MCNC: 6.8 G/DL (ref 6–8.5)
PROT UR QL STRIP: NEGATIVE
RBC # BLD AUTO: 5.45 10*6/MM3 (ref 3.77–5.28)
RBC #/AREA URNS HPF: ABNORMAL /HPF
SODIUM SERPL-SCNC: 137 MMOL/L (ref 136–145)
SP GR UR STRIP: 1.01 (ref 1–1.03)
TRIGL SERPL-MCNC: 205 MG/DL (ref 0–150)
TSH SERPL DL<=0.005 MIU/L-ACNC: 1.52 UIU/ML (ref 0.27–4.2)
UROBILINOGEN UR STRIP-MCNC: ABNORMAL MG/DL
VLDLC SERPL CALC-MCNC: 37 MG/DL (ref 5–40)
WBC # BLD AUTO: 10.68 10*3/MM3 (ref 3.4–10.8)
WBC #/AREA URNS HPF: ABNORMAL /HPF

## 2024-04-26 NOTE — PROGRESS NOTES
Patient's labs reviewed.  Her cholesterol is stable, however she is at increased risk for heart attack/stroke with history of high blood pressure, and tobacco abuse and obesity.  So if she is agreeable, I would like to change her from Zetia to a different medication to try to bring her cholesterol down.  Has she been on a statin medication in the past?  She is also now at the top of the prediabetic range and very close to diabetes.  She needs to work on trying to increase exercise.  Also need to work on watching carbohydrate intake (bread, pasta, potatoes, cereals, etc.).  Not saying that she cannot have these things but we need to decrease intake and have these more in moderation.  Can we please schedule her for a 3-month office visit to recheck her A1c.

## 2024-06-11 ENCOUNTER — TELEPHONE (OUTPATIENT)
Dept: INTERNAL MEDICINE | Facility: CLINIC | Age: 69
End: 2024-06-11

## 2024-06-11 NOTE — TELEPHONE ENCOUNTER
Caller: Julia Diaz    Relationship: Self    Best call back number:  [phone]    What orders are you requesting (i.e. lab or imaging):     MAMMOGRAM    In what timeframe would the patient need to come in:     APPOINTMENT IS SCHEDULED AT [facility] ON JUNE 24, 2024 - 9:15 AM    Where will you receive your lab/imaging services:     [facility] - [address]

## 2024-06-25 DIAGNOSIS — Z12.31 BREAST CANCER SCREENING BY MAMMOGRAM: ICD-10-CM

## 2024-06-29 DIAGNOSIS — I10 HYPERTENSION, BENIGN: ICD-10-CM

## 2024-07-01 RX ORDER — AMLODIPINE BESYLATE 10 MG/1
10 TABLET ORAL DAILY
Qty: 90 TABLET | Refills: 1 | Status: SHIPPED | OUTPATIENT
Start: 2024-07-01

## 2024-07-24 ENCOUNTER — OFFICE VISIT (OUTPATIENT)
Dept: INTERNAL MEDICINE | Facility: CLINIC | Age: 69
End: 2024-07-24
Payer: MEDICARE

## 2024-07-24 VITALS
DIASTOLIC BLOOD PRESSURE: 70 MMHG | HEART RATE: 84 BPM | SYSTOLIC BLOOD PRESSURE: 124 MMHG | OXYGEN SATURATION: 96 % | TEMPERATURE: 97.8 F | BODY MASS INDEX: 35.61 KG/M2 | HEIGHT: 63 IN | WEIGHT: 201 LBS

## 2024-07-24 DIAGNOSIS — I10 HYPERTENSION, BENIGN: ICD-10-CM

## 2024-07-24 DIAGNOSIS — R73.03 PREDIABETES: Primary | ICD-10-CM

## 2024-07-24 DIAGNOSIS — E78.2 MIXED HYPERLIPIDEMIA: ICD-10-CM

## 2024-07-24 LAB
EXPIRATION DATE: ABNORMAL
HBA1C MFR BLD: 6.4 % (ref 4.5–5.7)
Lab: ABNORMAL

## 2024-07-24 PROCEDURE — 1126F AMNT PAIN NOTED NONE PRSNT: CPT | Performed by: NURSE PRACTITIONER

## 2024-07-24 PROCEDURE — 3074F SYST BP LT 130 MM HG: CPT | Performed by: NURSE PRACTITIONER

## 2024-07-24 PROCEDURE — 99214 OFFICE O/P EST MOD 30 MIN: CPT | Performed by: NURSE PRACTITIONER

## 2024-07-24 PROCEDURE — 3078F DIAST BP <80 MM HG: CPT | Performed by: NURSE PRACTITIONER

## 2024-07-24 PROCEDURE — 3044F HG A1C LEVEL LT 7.0%: CPT | Performed by: NURSE PRACTITIONER

## 2024-07-24 PROCEDURE — 83036 HEMOGLOBIN GLYCOSYLATED A1C: CPT | Performed by: NURSE PRACTITIONER

## 2024-07-24 PROCEDURE — 1159F MED LIST DOCD IN RCRD: CPT | Performed by: NURSE PRACTITIONER

## 2024-07-24 PROCEDURE — 1160F RVW MEDS BY RX/DR IN RCRD: CPT | Performed by: NURSE PRACTITIONER

## 2024-07-24 NOTE — PROGRESS NOTES
Subjective     Chief Complaint:  Prediabetes follow-up    HPI:  Patient presents to the office today for a follow-up.  She has been in prediabetic range for a while however her hemoglobin A1c during her last office visit in April was the highest it has been.  Please see assessment and plan below.    Patient's PMR from outside medical facility reviewed and noted.    Past Medical History:   Past Medical History:   Diagnosis Date    Acid reflux     Allergic     Arthritis     Elevated cholesterol     History of medical problems knee replacement    History of transfusion     Hypertension     Pneumonia 2021    Sinusitis      Past Surgical History:  Past Surgical History:   Procedure Laterality Date    APPENDECTOMY   with hysterectomy    CARPAL TUNNEL RELEASE       SECTION      COLONOSCOPY      COLONOSCOPY N/A 2023    Procedure: COLONOSCOPY WITH ANESTHESIA;  Surgeon: Jeremy Cabral DO;  Location: [facility] ENDOSCOPY;  Service: Gastroenterology;  Laterality: N/A;  pre screen  post polyps  Jeny Alexander    HYSTERECTOMY      INNER EAR SURGERY      REPLACEMENT TOTAL KNEE       Social History:  reports that she has been smoking cigarettes. She started smoking about 58 years ago. She has a 29.3 pack-year smoking history. She has never used smokeless tobacco. She reports that she does not drink alcohol and does not use drugs.    Family History: family history includes Cancer in her father; Heart disease in her father; Obesity in her father and mother; Vision loss in her maternal grandmother.      Allergies:  No Known Allergies  Medications:  Prior to Admission medications    Medication Sig Start Date End Date Taking? Authorizing Provider   albuterol sulfate  (90 Base) MCG/ACT inhaler TAKE 2 PUFFS BY MOUTH EVERY 4 HOURS AS NEEDED FOR WHEEZE 22  Yes Reyes Damon MD   amLODIPine (NORVASC) 10 MG tablet Take 1 tablet by mouth once daily 24  Yes Jeny Alexander APRN   aspirin  "81 MG chewable tablet Chew 1 tablet Daily.   Yes Abril Walsh MD   esomeprazole (nexIUM) 40 MG capsule TAKE 1 CAPSULE BY MOUTH ONCE DAILY IN THE MORNING BEFORE BREAKFAST 9/28/23  Yes Irma Barber APRN   ezetimibe (ZETIA) 10 MG tablet Take 1 tablet by mouth Daily. 9/28/23  Yes rIma Barber APRN   fluticasone (FLONASE) 50 MCG/ACT nasal spray 2 sprays into the nostril(s) as directed by provider Daily.   Yes Abril Walsh MD   loratadine (CLARITIN) 10 MG tablet Take  by mouth.   Yes Abril Walsh MD   naproxen sodium (Aleve) 220 MG tablet Take 2.5 tablets by mouth 2 (Two) Times a Day As Needed (Arthritis pain). 10/18/23  Yes Jeny Alexander APRN   Omega-3 Fatty Acids (fish oil) 1000 MG capsule capsule Take 1 capsule by mouth Daily With Breakfast.   Yes ProviderAbril MD   Vitamin D, Cholecalciferol, 50 MCG (2000 UT) capsule Take 1 capsule by mouth Daily. 2/9/22  Yes Nidhi Fisher APRN       Objective     Vital Signs: /70 (BP Location: Left arm, Patient Position: Sitting, Cuff Size: Adult)   Pulse 84   Temp 97.8 °F (36.6 °C) (Temporal)   Ht 158.8 cm (62.52\")   Wt 91.2 kg (201 lb)   SpO2 96%   BMI 36.15 kg/m²   Physical Exam  Vitals and nursing note reviewed.   Constitutional:       General: She is not in acute distress.     Appearance: She is obese. She is not ill-appearing or toxic-appearing.   HENT:      Head: Normocephalic and atraumatic.      Mouth/Throat:      Mouth: Mucous membranes are moist.      Pharynx: Oropharynx is clear.   Cardiovascular:      Rate and Rhythm: Normal rate and regular rhythm.      Pulses: Normal pulses.      Heart sounds: Normal heart sounds.   Pulmonary:      Effort: Pulmonary effort is normal.      Breath sounds: No wheezing, rhonchi or rales.   Abdominal:      General: Bowel sounds are normal. There is no distension.      Palpations: Abdomen is soft.      Tenderness: There is no abdominal tenderness.   Musculoskeletal:        "  General: No swelling or tenderness. Normal range of motion.      Cervical back: Normal range of motion and neck supple. No tenderness.   Skin:     General: Skin is warm and dry.      Findings: No erythema or rash.   Neurological:      General: No focal deficit present.      Mental Status: She is alert and oriented to person, place, and time.   Psychiatric:         Mood and Affect: Mood normal.         Behavior: Behavior normal.         Thought Content: Thought content normal.         Judgment: Judgment normal.       Results Reviewed:  POC Glycated Hemoglobin, Total (07/24/2024 11:19)     Assessment / Plan     Assessment/Plan:  Diagnoses and all orders for this visit:    1. Prediabetes (Primary)  -     POC Glycated Hemoglobin, Total    2. Hypertension, benign    3. Mixed hyperlipidemia       Patient presents to the office today for a follow-up.  She has been in prediabetic range for several years however her hemoglobin A1c during her last office visit in April was the highest it has been at 6.4%.  She and I had discussed dietary changes she could make in order to help improve this.  She has been cutting back on carbohydrates, specifically bread.  She does admit that she has not been as mindful about cutting back on sweets and has been eating more ice cream as of late.  Her hemoglobin A1c today is stable at 6.4%.  Patient needs to continue to be mindful of her diet.  She does not seem to live an overly sedentary lifestyle, tries to keep herself busy.  We did discuss possible addition of metformin to her medication regimen as this may help not only lower her A1c but possibly help facilitate weight loss.  She is interested in this option but would like to do some research on her own before starting any new medications.  I had planned to follow-up in October, however as she is going to be transitioning care to Dr. Brewer she asked if this could be pushed out to December as her  has an appointment that same day  with Dr. Brewer.  This is reasonable as her hemoglobin A1c is stable today.    Blood pressure is well-controlled in the office today at 124/70.  She did bring her home blood pressure cuff with her today which is a wrist cuff.  Her home cuff is read 118/72 so is quite comparable to the office cuff.  She is monitoring her blood pressure regularly at home and has had similar readings as well.  She denies any chest pain, shortness of breath, dizziness/lightheadedness or trouble with headaches.  Does not seem to be having any side effects or problems with amlodipine, will continue present dosage of 10 mg daily.  Last CMP assessed in Aprilindicated normal renal function and electrolytes.    At her last office visit, the patient's lipid panel overall appears stable but does show continued elevation of total/LDL cholesterol and triglycerides.  She apparently has tried some type of statin in the past which caused myalgias, continues on Zetia.  She is also taking a fish oil supplement.    Return for Next scheduled follow up, Follow up w/. unless patient needs to be seen sooner or acute issues arise.    I have discussed the patient results/orders and and plan/recommendation with them at today's visit.      Jeny Alexander, BELEM   07/24/2024        Answers submitted by the patient for this visit:  Primary Reason for Visit (Submitted on 7/18/2024)  What is the primary reason for your visit?: High Blood Pressure  High Blood Pressure Questionnaire (Submitted on 7/18/2024)  Chief Complaint: Hypertension  Chronicity: chronic  Onset: more than 1 year ago  Progression since onset: stable  Condition status: controlled  anxiety: No  blurred vision: No  chest pain: No  headaches: No  malaise/fatigue: No  orthopnea: No  palpitations: No  peripheral edema: No  shortness of breath: No  Agents associated with hypertension: decongestants, NSAIDs  CAD risks: dyslipidemia, obesity, smoking/tobacco exposure  Compliance problems: no  compliance problems  Additional Information: FOLLOW UP

## 2024-09-25 RX ORDER — ESOMEPRAZOLE MAGNESIUM 40 MG/1
CAPSULE, DELAYED RELEASE ORAL
Qty: 90 CAPSULE | Refills: 0 | Status: SHIPPED | OUTPATIENT
Start: 2024-09-25

## 2024-09-25 RX ORDER — EZETIMIBE 10 MG/1
10 TABLET ORAL DAILY
Qty: 90 TABLET | Refills: 0 | Status: SHIPPED | OUTPATIENT
Start: 2024-09-25

## 2024-12-11 ENCOUNTER — OFFICE VISIT (OUTPATIENT)
Dept: INTERNAL MEDICINE | Facility: CLINIC | Age: 69
End: 2024-12-11
Payer: MEDICARE

## 2024-12-11 VITALS
HEART RATE: 65 BPM | SYSTOLIC BLOOD PRESSURE: 114 MMHG | WEIGHT: 199 LBS | OXYGEN SATURATION: 96 % | TEMPERATURE: 97.4 F | HEIGHT: 63 IN | DIASTOLIC BLOOD PRESSURE: 70 MMHG | BODY MASS INDEX: 35.26 KG/M2

## 2024-12-11 DIAGNOSIS — E78.2 MIXED HYPERLIPIDEMIA: ICD-10-CM

## 2024-12-11 DIAGNOSIS — E66.812 CLASS 2 SEVERE OBESITY DUE TO EXCESS CALORIES WITH SERIOUS COMORBIDITY AND BODY MASS INDEX (BMI) OF 35.0 TO 35.9 IN ADULT: Primary | ICD-10-CM

## 2024-12-11 DIAGNOSIS — M12.9 ARTHROPATHY, MULTIPLE SITES: ICD-10-CM

## 2024-12-11 DIAGNOSIS — Z87.891 PERSONAL HISTORY OF NICOTINE DEPENDENCE: ICD-10-CM

## 2024-12-11 DIAGNOSIS — Z72.0 TOBACCO USE: ICD-10-CM

## 2024-12-11 DIAGNOSIS — I10 HYPERTENSION, BENIGN: ICD-10-CM

## 2024-12-11 DIAGNOSIS — R73.03 PREDIABETES: ICD-10-CM

## 2024-12-11 DIAGNOSIS — E55.9 VITAMIN D DEFICIENCY: ICD-10-CM

## 2024-12-11 DIAGNOSIS — E66.01 CLASS 2 SEVERE OBESITY DUE TO EXCESS CALORIES WITH SERIOUS COMORBIDITY AND BODY MASS INDEX (BMI) OF 35.0 TO 35.9 IN ADULT: Primary | ICD-10-CM

## 2024-12-11 LAB — HBA1C MFR BLD: 6.3 % (ref 4.5–5.7)

## 2024-12-11 PROCEDURE — 3074F SYST BP LT 130 MM HG: CPT | Performed by: INTERNAL MEDICINE

## 2024-12-11 PROCEDURE — 99214 OFFICE O/P EST MOD 30 MIN: CPT | Performed by: INTERNAL MEDICINE

## 2024-12-11 PROCEDURE — 83036 HEMOGLOBIN GLYCOSYLATED A1C: CPT | Performed by: INTERNAL MEDICINE

## 2024-12-11 PROCEDURE — 3044F HG A1C LEVEL LT 7.0%: CPT | Performed by: INTERNAL MEDICINE

## 2024-12-11 PROCEDURE — 1126F AMNT PAIN NOTED NONE PRSNT: CPT | Performed by: INTERNAL MEDICINE

## 2024-12-11 PROCEDURE — G2211 COMPLEX E/M VISIT ADD ON: HCPCS | Performed by: INTERNAL MEDICINE

## 2024-12-11 PROCEDURE — 3078F DIAST BP <80 MM HG: CPT | Performed by: INTERNAL MEDICINE

## 2024-12-11 RX ORDER — IBUPROFEN 200 MG
600 TABLET ORAL EVERY 6 HOURS PRN
COMMUNITY

## 2024-12-26 RX ORDER — ESOMEPRAZOLE MAGNESIUM 40 MG/1
CAPSULE, DELAYED RELEASE ORAL
Qty: 90 CAPSULE | Refills: 0 | Status: SHIPPED | OUTPATIENT
Start: 2024-12-26

## 2024-12-26 RX ORDER — EZETIMIBE 10 MG/1
10 TABLET ORAL DAILY
Qty: 90 TABLET | Refills: 0 | Status: SHIPPED | OUTPATIENT
Start: 2024-12-26

## 2024-12-30 ENCOUNTER — TELEPHONE (OUTPATIENT)
Dept: INTERNAL MEDICINE | Facility: CLINIC | Age: 69
End: 2024-12-30

## 2024-12-30 NOTE — TELEPHONE ENCOUNTER
Caller: Julia Diaz    Relationship: Self    Best call back number: [phone]     What medication are you requesting: WHATEVER IS RECOMMENDED    What are your current symptoms: BAD COUGH, SINUS DRAINAGE, CONGESTION    How long have you been experiencing symptoms: SINCE PIOTR    Have you had these symptoms before:    [x] Yes  [] No    Have you been treated for these symptoms before:   [x] Yes  [] No    If a prescription is needed, what is your preferred pharmacy and phone number: [place] [id number] [facility] [phone] [facility] [phone]      Additional notes:  DR RAY NO AVAILABILITY

## 2024-12-31 ENCOUNTER — OFFICE VISIT (OUTPATIENT)
Dept: INTERNAL MEDICINE | Facility: CLINIC | Age: 69
End: 2024-12-31
Payer: MEDICARE

## 2024-12-31 VITALS
HEIGHT: 63 IN | RESPIRATION RATE: 18 BRPM | BODY MASS INDEX: 35.97 KG/M2 | SYSTOLIC BLOOD PRESSURE: 122 MMHG | OXYGEN SATURATION: 97 % | HEART RATE: 83 BPM | WEIGHT: 203 LBS | DIASTOLIC BLOOD PRESSURE: 76 MMHG | TEMPERATURE: 98.4 F

## 2024-12-31 DIAGNOSIS — J06.9 ACUTE URI: Primary | ICD-10-CM

## 2024-12-31 PROCEDURE — 3078F DIAST BP <80 MM HG: CPT | Performed by: INTERNAL MEDICINE

## 2024-12-31 PROCEDURE — 3074F SYST BP LT 130 MM HG: CPT | Performed by: INTERNAL MEDICINE

## 2024-12-31 PROCEDURE — 1126F AMNT PAIN NOTED NONE PRSNT: CPT | Performed by: INTERNAL MEDICINE

## 2024-12-31 PROCEDURE — 99213 OFFICE O/P EST LOW 20 MIN: CPT | Performed by: INTERNAL MEDICINE

## 2024-12-31 PROCEDURE — 96372 THER/PROPH/DIAG INJ SC/IM: CPT | Performed by: INTERNAL MEDICINE

## 2024-12-31 RX ORDER — METHYLPREDNISOLONE 4 MG/1
TABLET ORAL
Qty: 21 TABLET | Refills: 0 | Status: SHIPPED | OUTPATIENT
Start: 2024-12-31

## 2024-12-31 RX ORDER — METHYLPREDNISOLONE SODIUM SUCCINATE 40 MG/ML
40 INJECTION INTRAMUSCULAR; INTRAVENOUS ONCE
Status: COMPLETED | OUTPATIENT
Start: 2024-12-31 | End: 2024-12-31

## 2024-12-31 RX ORDER — GUAIFENESIN 600 MG/1
600 TABLET, EXTENDED RELEASE ORAL 2 TIMES DAILY
COMMUNITY

## 2024-12-31 RX ADMIN — METHYLPREDNISOLONE SODIUM SUCCINATE 40 MG: 40 INJECTION INTRAMUSCULAR; INTRAVENOUS at 16:13

## 2025-01-04 NOTE — PROGRESS NOTES
"      Chief Complaint  Cough (X 1 week. Patient denies any fever. Patient is taking day and night-quil.) and Sinus Problem (Patient states she has sinus drainage X9 days.)    Serjio Diaz presents to [facility]    HPI    Patient here for the above problems.  See Assessment and Plan for further HPI components.      Review of Systems   Constitutional:  Negative for chills and fever.   HENT:  Positive for rhinorrhea and sore throat. Negative for ear pain and postnasal drip.    Respiratory:  Positive for cough. Negative for shortness of breath and wheezing.    Cardiovascular:  Negative for chest pain.   Musculoskeletal:  Negative for myalgias.   Skin:  Negative for rash.   Neurological:  Negative for headaches.       Objective   Vital Signs:  /76 (BP Location: Left arm, Patient Position: Sitting, Cuff Size: Adult)   Pulse 83   Temp 98.4 °F (36.9 °C) (Infrared)   Resp 18   Ht 158.8 cm (62.52\")   Wt 92.1 kg (203 lb)   SpO2 97%   BMI 36.51 kg/m²   Estimated body mass index is 36.51 kg/m² as calculated from the following:    Height as of this encounter: 158.8 cm (62.52\").    Weight as of this encounter: 92.1 kg (203 lb).      Physical Exam  Vitals and nursing note reviewed.   Constitutional:       Appearance: She is not ill-appearing.   Eyes:      General: No scleral icterus.     Conjunctiva/sclera: Conjunctivae normal.   Pulmonary:      Effort: Pulmonary effort is normal. No respiratory distress.   Skin:     General: Skin is warm.      Coloration: Skin is not pale.   Neurological:      General: No focal deficit present.      Mental Status: She is alert and oriented to person, place, and time.   Psychiatric:         Mood and Affect: Mood normal.         Behavior: Behavior normal.                     Assessment and Plan   Diagnoses and all orders for this visit:    1. Acute URI (Primary)  -     methylPREDNISolone sodium succinate (SOLU-Medrol) injection 40 " mg    Other orders  -     methylPREDNISolone (MEDROL) 4 MG dose pack; Take as directed on package instructions.  Dispense: 21 tablet; Refill: 0        History of Present Illness  The patient presents for evaluation of cough and congestion.    She reports experiencing severe fatigue, which she attributes to the holiday season. She has been suffering from a persistent cough since [place] morning, which has led to generalized body soreness. She does not believe her symptoms are related to COVID-19 but rather due to postnasal drip causing a gag reflex. She reports no fever or chills. She has been avoiding contact with her great-grandchild due to her illness. She also mentions that her family members have been ill for the past 2 weeks. She spends a significant amount of time outdoors throughout the year, including during inclement weather conditions. She has not yet quit smoking. She has been managing her symptoms with Flonase and Mucinex, taken every 12 hours, and has been maintaining hydration with water and fruit juice. She expresses a preference for steroid injections over oral medication.    SOCIAL HISTORY  She admits to smoking.    MEDICATIONS  Flonase, Mucinex      Assessment & Plan  1. Cough and congestion.  Her symptoms may be indicative of a sinus infection, potentially caused by influenza or COVID-19. However, she is not currently contagious. The absence of fever and chills suggests that antibiotics may not be necessary at this stage. Her blood glucose levels have been within normal limits, thus there is no immediate concern regarding potential hyperglycemia due to steroid administration. She has been advised to increase her Mucinex dosage to 1200 mg twice daily, accompanied by ample water intake. A prescription for a steroid pack has been provided, with instructions to take 6 tablets on the first day, followed by a daily decrease of one tablet. She has been informed that she can discontinue the steroid pack  if her symptoms improve significantly after the first day. A steroid injection will also be administered during this visit. She has been encouraged to continue with her scheduled CT scan on Monday. She has been advised to cease smoking. If her condition does not improve by Monday, she will inform us so that we can consider initiating antibiotic therapy.    PROCEDURE  A steroid injection was administered during this visit.        Result Review :                               Follow Up   Return in about 6 months (around 6/30/2025), or if symptoms worsen or fail to improve, for Medicare Wellness - Labs prior to visit, follow up for above problems. Longitudinal care..  Patient was given instructions and counseling regarding her condition or for health maintenance advice. Please see specific information pulled into the AVS if appropriate.       CLAUDY Brewer MD, FACP, [facility]      Electronically signed by German Brewer MD, 01/04/25, 10:53 AM CST.    Patient or patient representative verbalized consent for the use of Ambient Listening during the visit with  German Brewer MD for chart documentation. 1/4/2025  11:00 CST      Answers submitted by the patient for this visit:  Primary Reason for Visit (Submitted on 12/31/2024)  What is the primary reason for your visit?: Cough  Cough Questionnaire (Submitted on 12/31/2024)  Chief Complaint: Cough  Chronicity: recurrent  Onset: in the past 7 days  Progression since onset: improving  Frequency: every few minutes  Cough characteristics: dry  ear congestion: No  heartburn: No  hemoptysis: No  nasal congestion: No  sweats: No  weight loss: No  Aggravated by: nothing  Risk factors for lung disease: smoking/tobacco exposure

## 2025-01-15 ENCOUNTER — OFFICE VISIT (OUTPATIENT)
Dept: INTERNAL MEDICINE | Facility: CLINIC | Age: 70
End: 2025-01-15
Payer: MEDICARE

## 2025-01-15 VITALS
OXYGEN SATURATION: 97 % | BODY MASS INDEX: 35.26 KG/M2 | TEMPERATURE: 97.8 F | WEIGHT: 199 LBS | HEART RATE: 88 BPM | HEIGHT: 63 IN | DIASTOLIC BLOOD PRESSURE: 64 MMHG | SYSTOLIC BLOOD PRESSURE: 130 MMHG

## 2025-01-15 DIAGNOSIS — J06.9 ACUTE URI: Primary | ICD-10-CM

## 2025-01-15 DIAGNOSIS — J10.1 INFLUENZA A: ICD-10-CM

## 2025-01-15 LAB
EXPIRATION DATE: ABNORMAL
FLUAV AG UPPER RESP QL IA.RAPID: DETECTED
FLUBV AG UPPER RESP QL IA.RAPID: NOT DETECTED
INTERNAL CONTROL: ABNORMAL
Lab: ABNORMAL
SARS-COV-2 AG UPPER RESP QL IA.RAPID: NOT DETECTED

## 2025-01-15 PROCEDURE — 99213 OFFICE O/P EST LOW 20 MIN: CPT | Performed by: INTERNAL MEDICINE

## 2025-01-15 PROCEDURE — 3078F DIAST BP <80 MM HG: CPT | Performed by: INTERNAL MEDICINE

## 2025-01-15 PROCEDURE — 87428 SARSCOV & INF VIR A&B AG IA: CPT | Performed by: INTERNAL MEDICINE

## 2025-01-15 PROCEDURE — 3075F SYST BP GE 130 - 139MM HG: CPT | Performed by: INTERNAL MEDICINE

## 2025-01-15 PROCEDURE — 1126F AMNT PAIN NOTED NONE PRSNT: CPT | Performed by: INTERNAL MEDICINE

## 2025-01-15 RX ORDER — OSELTAMIVIR PHOSPHATE 75 MG/1
75 CAPSULE ORAL 2 TIMES DAILY
Qty: 10 CAPSULE | Refills: 0 | Status: SHIPPED | OUTPATIENT
Start: 2025-01-15

## 2025-01-15 NOTE — PROGRESS NOTES
"      Chief Complaint  Cough (Cough,congestion and low grade temp at night- this all started at [place] )    Subjective        Julia Diaz presents to [facility]    HPI    Patient here for the above problems.  See Assessment and Plan for further HPI components.      Review of Systems   Constitutional:  Negative for chills and fever.   HENT:  Positive for postnasal drip. Negative for ear pain, rhinorrhea and sore throat.    Respiratory:  Positive for cough and shortness of breath. Negative for wheezing.    Cardiovascular:  Negative for chest pain.   Musculoskeletal:  Negative for myalgias.   Skin:  Negative for rash.   Neurological:  Negative for headaches.       Objective   Vital Signs:  /64 (BP Location: Left arm, Patient Position: Sitting, Cuff Size: Adult)   Pulse 88   Temp 97.8 °F (36.6 °C) (Temporal)   Ht 158.8 cm (62.52\")   Wt 90.3 kg (199 lb)   SpO2 97%   BMI 35.79 kg/m²   Estimated body mass index is 35.79 kg/m² as calculated from the following:    Height as of this encounter: 158.8 cm (62.52\").    Weight as of this encounter: 90.3 kg (199 lb).      Physical Exam  Vitals and nursing note reviewed.   Constitutional:       Appearance: She is not ill-appearing.   HENT:      Mouth/Throat:      Pharynx: Posterior oropharyngeal erythema present. No oropharyngeal exudate.   Eyes:      General: No scleral icterus.     Conjunctiva/sclera: Conjunctivae normal.   Pulmonary:      Effort: Pulmonary effort is normal. No respiratory distress.   Skin:     General: Skin is warm.      Coloration: Skin is not pale.   Neurological:      General: No focal deficit present.      Mental Status: She is alert and oriented to person, place, and time.   Psychiatric:         Mood and Affect: Mood normal.         Behavior: Behavior normal.                     Assessment and Plan   Diagnoses and all orders for this visit:    1. Acute URI (Primary)  -     XR Chest PA & Lateral; Future  -     " POCT SARS-CoV-2 + Flu Antigen HASEEB    2. Influenza A  -     oseltamivir (Tamiflu) 75 MG capsule; Take 1 capsule by mouth 2 (Two) Times a Day.  Dispense: 10 capsule; Refill: 0  -     POCT SARS-CoV-2 + Flu Antigen HASEEB        History of Present Illness  The patient is a 69-year-old female presenting today for an upper respiratory infection.    She reports a recurrence of her symptoms on Sunday evening, following a period of improvement over the weekend. She has been experiencing these symptoms since Christmas, with no known exposure to sick individuals. Her symptoms include a non-productive cough and congestion, which have been persistent. She experienced one episode of severe coughing that resulted in pain but has not had any episodes of vomiting. She also reports feeling weak after attending [employer] and dinner on Sunday. Additionally, she mentions that her great-grandson is currently going to the hospital for evaluation.      Assessment & Plan  1. Upper respiratory infection. Influenza A  Patient had an illness around christmas and had started getting better and then over the last 24-48 hours has been worsening.  I had a suspicion that patient had contracted another illness or got a secondary bacterial infection.  Influenza A turned positive very quickly after being applied to the test strip.  Can hold on chest x-ray. No antibiotics needed, will send in tamiflu.  Will send in tamiflu for her  which has started having symptoms last night/today.  He is also a patient of mine.      No indication for renal adjustment.        Result Review :                               Follow Up   Return in about 6 months (around 7/15/2025), or if symptoms worsen or fail to improve, for follow up for above problems. Longitudinal care..  Patient was given instructions and counseling regarding her condition or for health maintenance advice. Please see specific information pulled into the AVS if appropriate.       CLAUDY Brewer MD,  FACP, FHM      Electronically signed by German Brewer MD, 01/15/25, 10:41 AM CST.    Patient or patient representative verbalized consent for the use of Ambient Listening during the visit with  German Brewer MD for chart documentation. 1/15/2025  10:51 CST      Answers submitted by the patient for this visit:  Primary Reason for Visit (Submitted on 1/15/2025)  What is the primary reason for your visit?: Cough  Cough Questionnaire (Submitted on 1/15/2025)  Chief Complaint: Cough  Chronicity: recurrent  Onset: 1 to 4 weeks ago  Progression since onset: coming and going  Frequency: every few minutes  Cough characteristics: rattling  ear congestion: Yes  heartburn: No  hemoptysis: No  nasal congestion: No  sweats: No  weight loss: No  Aggravated by: pollens  Risk factors for lung disease: smoking/tobacco exposure

## 2025-02-06 DIAGNOSIS — I10 HYPERTENSION, BENIGN: ICD-10-CM

## 2025-02-06 RX ORDER — AMLODIPINE BESYLATE 10 MG/1
10 TABLET ORAL DAILY
Qty: 90 TABLET | Refills: 3 | Status: SHIPPED | OUTPATIENT
Start: 2025-02-06

## 2025-02-06 NOTE — TELEPHONE ENCOUNTER
Caller: Julia Diaz    Relationship: Self    Best call back number: [phone]       Requested Prescriptions:   Requested Prescriptions     Pending Prescriptions Disp Refills    amLODIPine (NORVASC) 10 MG tablet 90 tablet 1     Sig: Take 1 tablet by mouth Daily.        Pharmacy where request should be sent: [facility] [address] [place] [address] [address] [facility] - [phone] [facility] [phone] FX     Last office visit with prescribing clinician: 1/15/2025   Last telemedicine visit with prescribing clinician: Visit date not found   Next office visit with prescribing clinician: 6/12/2025     Additional details provided by patient: PATIENT IS OUT    Does the patient have less than a 3 day supply:  [x] Yes  [] No    Would you like a call back once the refill request has been completed: [x] Yes [] No    If the office needs to give you a call back, can they leave a voicemail: [x] Yes [] No    [employer] Rep   02/06/25 10:29 CST

## 2025-02-17 ENCOUNTER — TELEPHONE (OUTPATIENT)
Dept: INTERNAL MEDICINE | Facility: CLINIC | Age: 70
End: 2025-02-17
Payer: MEDICARE

## 2025-02-17 RX ORDER — CEFDINIR 300 MG/1
300 CAPSULE ORAL 2 TIMES DAILY
Qty: 14 CAPSULE | Refills: 0 | Status: SHIPPED | OUTPATIENT
Start: 2025-02-17

## 2025-02-17 NOTE — TELEPHONE ENCOUNTER
Caller: Julia Diaz    Relationship: Self    Best call back number: [phone]     What medication are you requesting: ANTIBIOTIC    What are your current symptoms: COUGH, CONGESTION,     How long have you been experiencing symptoms: SINCE LAST APPT 1/15/2025    Have you had these symptoms before:    [x] Yes  [] No    Have you been treated for these symptoms before:   [x] Yes  [] No    If a prescription is needed, what is your preferred pharmacy and phone number:    [facility]    Additional notes:  HAD SYMPTOMS AT APPT LAST MONTH WITH DR. RAY AND WAS ADVISED THAT IF THIS DIDN'T GET BETTER TO CALL AND REQUEST AN ANTIBIOTIC TO TREAT.

## 2025-03-18 ENCOUNTER — TELEPHONE (OUTPATIENT)
Dept: MRI IMAGING | Facility: HOSPITAL | Age: 70
End: 2025-03-18
Payer: MEDICARE

## 2025-03-18 NOTE — TELEPHONE ENCOUNTER
Called patient in regards to overdue LDCT again, Patient will call when she is ready to schedule, she did not want me calling back in a couple of months 3/18/25 ANISA

## 2025-03-28 RX ORDER — ESOMEPRAZOLE MAGNESIUM 40 MG/1
40 CAPSULE, DELAYED RELEASE ORAL
Qty: 90 CAPSULE | Refills: 3 | Status: SHIPPED | OUTPATIENT
Start: 2025-03-28

## 2025-03-28 RX ORDER — EZETIMIBE 10 MG/1
10 TABLET ORAL DAILY
Qty: 90 TABLET | Refills: 2 | Status: SHIPPED | OUTPATIENT
Start: 2025-03-28

## 2025-06-06 DIAGNOSIS — E78.2 MIXED HYPERLIPIDEMIA: ICD-10-CM

## 2025-06-06 DIAGNOSIS — I10 HYPERTENSION, BENIGN: ICD-10-CM

## 2025-06-06 DIAGNOSIS — R73.03 PREDIABETES: ICD-10-CM

## 2025-06-06 DIAGNOSIS — E55.9 VITAMIN D DEFICIENCY: ICD-10-CM

## 2025-06-07 LAB
25(OH)D3+25(OH)D2 SERPL-MCNC: 49.1 NG/ML (ref 30–100)
ALBUMIN SERPL-MCNC: 4.3 G/DL (ref 3.5–5.2)
ALBUMIN/GLOB SERPL: 1.7 G/DL
ALP SERPL-CCNC: 111 U/L (ref 39–117)
ALT SERPL-CCNC: 14 U/L (ref 1–33)
APPEARANCE UR: CLEAR
AST SERPL-CCNC: 18 U/L (ref 1–32)
BACTERIA #/AREA URNS HPF: ABNORMAL /HPF
BASOPHILS # BLD AUTO: 0.05 10*3/MM3 (ref 0–0.2)
BASOPHILS NFR BLD AUTO: 0.6 % (ref 0–1.5)
BILIRUB SERPL-MCNC: 0.3 MG/DL (ref 0–1.2)
BILIRUB UR QL STRIP: NEGATIVE
BUN SERPL-MCNC: 11 MG/DL (ref 8–23)
BUN/CREAT SERPL: 14.3 (ref 7–25)
CALCIUM SERPL-MCNC: 9.7 MG/DL (ref 8.6–10.5)
CASTS URNS MICRO: ABNORMAL
CHLORIDE SERPL-SCNC: 103 MMOL/L (ref 98–107)
CHOLEST SERPL-MCNC: 200 MG/DL (ref 0–200)
CO2 SERPL-SCNC: 23.9 MMOL/L (ref 22–29)
COLOR UR: YELLOW
CREAT SERPL-MCNC: 0.77 MG/DL (ref 0.57–1)
EGFRCR SERPLBLD CKD-EPI 2021: 82.1 ML/MIN/1.73
EOSINOPHIL # BLD AUTO: 0.15 10*3/MM3 (ref 0–0.4)
EOSINOPHIL NFR BLD AUTO: 1.7 % (ref 0.3–6.2)
EPI CELLS #/AREA URNS HPF: ABNORMAL /HPF
ERYTHROCYTE [DISTWIDTH] IN BLOOD BY AUTOMATED COUNT: 13.7 % (ref 12.3–15.4)
GLOBULIN SER CALC-MCNC: 2.6 GM/DL
GLUCOSE SERPL-MCNC: 131 MG/DL (ref 65–99)
GLUCOSE UR QL STRIP: NEGATIVE
HBA1C MFR BLD: 6.2 % (ref 4.8–5.6)
HCT VFR BLD AUTO: 49.7 % (ref 34–46.6)
HDLC SERPL-MCNC: 41 MG/DL (ref 40–60)
HGB BLD-MCNC: 16 G/DL (ref 12–15.9)
HGB UR QL STRIP: NEGATIVE
IMM GRANULOCYTES # BLD AUTO: 0.03 10*3/MM3 (ref 0–0.05)
IMM GRANULOCYTES NFR BLD AUTO: 0.3 % (ref 0–0.5)
KETONES UR QL STRIP: ABNORMAL
LDLC SERPL CALC-MCNC: 127 MG/DL (ref 0–100)
LEUKOCYTE ESTERASE UR QL STRIP: NEGATIVE
LYMPHOCYTES # BLD AUTO: 2.71 10*3/MM3 (ref 0.7–3.1)
LYMPHOCYTES NFR BLD AUTO: 31.4 % (ref 19.6–45.3)
MCH RBC QN AUTO: 29.3 PG (ref 26.6–33)
MCHC RBC AUTO-ENTMCNC: 32.2 G/DL (ref 31.5–35.7)
MCV RBC AUTO: 90.9 FL (ref 79–97)
MONOCYTES # BLD AUTO: 0.63 10*3/MM3 (ref 0.1–0.9)
MONOCYTES NFR BLD AUTO: 7.3 % (ref 5–12)
NEUTROPHILS # BLD AUTO: 5.05 10*3/MM3 (ref 1.7–7)
NEUTROPHILS NFR BLD AUTO: 58.7 % (ref 42.7–76)
NITRITE UR QL STRIP: NEGATIVE
NRBC BLD AUTO-RTO: 0 /100 WBC (ref 0–0.2)
PH UR STRIP: 6 [PH] (ref 5–8)
PLATELET # BLD AUTO: 351 10*3/MM3 (ref 140–450)
POTASSIUM SERPL-SCNC: 5.2 MMOL/L (ref 3.5–5.2)
PROT SERPL-MCNC: 6.9 G/DL (ref 6–8.5)
PROT UR QL STRIP: ABNORMAL
RBC # BLD AUTO: 5.47 10*6/MM3 (ref 3.77–5.28)
RBC #/AREA URNS HPF: ABNORMAL /HPF
SODIUM SERPL-SCNC: 139 MMOL/L (ref 136–145)
SP GR UR STRIP: 1.02 (ref 1–1.03)
TRIGL SERPL-MCNC: 180 MG/DL (ref 0–150)
TSH SERPL DL<=0.005 MIU/L-ACNC: 1.48 UIU/ML (ref 0.27–4.2)
UROBILINOGEN UR STRIP-MCNC: ABNORMAL MG/DL
VLDLC SERPL CALC-MCNC: 32 MG/DL (ref 5–40)
WBC # BLD AUTO: 8.62 10*3/MM3 (ref 3.4–10.8)
WBC #/AREA URNS HPF: ABNORMAL /HPF

## 2025-06-12 ENCOUNTER — OFFICE VISIT (OUTPATIENT)
Dept: INTERNAL MEDICINE | Facility: CLINIC | Age: 70
End: 2025-06-12
Payer: MEDICARE

## 2025-06-12 VITALS
TEMPERATURE: 97.6 F | DIASTOLIC BLOOD PRESSURE: 82 MMHG | SYSTOLIC BLOOD PRESSURE: 136 MMHG | HEIGHT: 63 IN | BODY MASS INDEX: 35.61 KG/M2 | WEIGHT: 201 LBS | HEART RATE: 88 BPM | OXYGEN SATURATION: 94 %

## 2025-06-12 DIAGNOSIS — G56.02 CARPAL TUNNEL SYNDROME OF LEFT WRIST: ICD-10-CM

## 2025-06-12 DIAGNOSIS — Z78.0 POST-MENOPAUSAL: ICD-10-CM

## 2025-06-12 DIAGNOSIS — Z72.0 TOBACCO USE: ICD-10-CM

## 2025-06-12 DIAGNOSIS — E78.2 MIXED HYPERLIPIDEMIA: ICD-10-CM

## 2025-06-12 DIAGNOSIS — E55.9 VITAMIN D DEFICIENCY: ICD-10-CM

## 2025-06-12 DIAGNOSIS — I10 HYPERTENSION, BENIGN: ICD-10-CM

## 2025-06-12 DIAGNOSIS — R73.03 PREDIABETES: ICD-10-CM

## 2025-06-12 DIAGNOSIS — E66.01 CLASS 2 SEVERE OBESITY DUE TO EXCESS CALORIES WITH SERIOUS COMORBIDITY AND BODY MASS INDEX (BMI) OF 36.0 TO 36.9 IN ADULT: ICD-10-CM

## 2025-06-12 DIAGNOSIS — K59.00 CONSTIPATION, UNSPECIFIED CONSTIPATION TYPE: ICD-10-CM

## 2025-06-12 DIAGNOSIS — E66.812 CLASS 2 SEVERE OBESITY DUE TO EXCESS CALORIES WITH SERIOUS COMORBIDITY AND BODY MASS INDEX (BMI) OF 36.0 TO 36.9 IN ADULT: ICD-10-CM

## 2025-06-12 DIAGNOSIS — Z12.31 SCREENING MAMMOGRAM, ENCOUNTER FOR: Primary | ICD-10-CM

## 2025-06-12 DIAGNOSIS — Z87.891 PERSONAL HISTORY OF NICOTINE DEPENDENCE: ICD-10-CM

## 2025-06-12 NOTE — PROGRESS NOTES
Subjective   The ABCs of the Annual Wellness Visit  Medicare Wellness Visit      Julia Diaz is a 70 y.o. patient who presents for a Medicare Wellness Visit.    The following portions of the patient's history were reviewed and   updated as appropriate: allergies, current medications, past family history, past medical history, past social history, past surgical history, and problem list.    Compared to one year ago, the patient's physical   health is better.  Compared to one year ago, the patient's mental   health is the same.    Recent Hospitalizations:  She was not admitted to the hospital during the last year.     Current Medical Providers:  Patient Care Team:  German Brewer MD as PCP - General (Internal Medicine)    Outpatient Medications Prior to Visit   Medication Sig Dispense Refill    albuterol sulfate  (90 Base) MCG/ACT inhaler TAKE 2 PUFFS BY MOUTH EVERY 4 HOURS AS NEEDED FOR WHEEZE 18 g 2    amLODIPine (NORVASC) 10 MG tablet Take 1 tablet by mouth Daily. 90 tablet 3    aspirin 81 MG chewable tablet Chew 1 tablet Daily.      esomeprazole (nexIUM) 40 MG capsule TAKE 1 CAPSULE BY MOUTH ONCE DAILY IN THE MORNING BEFORE BREAKFAST 90 capsule 3    ezetimibe (ZETIA) 10 MG tablet Take 1 tablet by mouth once daily 90 tablet 2    fluticasone (FLONASE) 50 MCG/ACT nasal spray Administer 2 sprays into the nostril(s) as directed by provider Daily.      guaiFENesin (MUCINEX) 600 MG 12 hr tablet Take 2 tablets by mouth 2 (Two) Times a Day.      ibuprofen (ADVIL,MOTRIN) 200 MG tablet Take 3 tablets by mouth Every 6 (Six) Hours As Needed for Mild Pain.      loratadine (CLARITIN) 10 MG tablet Take  by mouth.      naproxen sodium (Aleve) 220 MG tablet Take 2.5 tablets by mouth 2 (Two) Times a Day As Needed (Arthritis pain).      Vitamin D, Cholecalciferol, 50 MCG (2000 UT) capsule Take 1 capsule by mouth Daily. 30 capsule 5    Omega-3 Fatty Acids (fish oil) 1000 MG capsule capsule Take 1 capsule by mouth  "Daily With Breakfast. (Patient not taking: Reported on 6/12/2025)      cefdinir (OMNICEF) 300 MG capsule Take 1 capsule by mouth 2 (Two) Times a Day. (Patient not taking: Reported on 6/12/2025) 14 capsule 0     No facility-administered medications prior to visit.     No opioid medication identified on active medication list. I have reviewed chart for other potential  high risk medication/s and harmful drug interactions in the elderly.      Aspirin is on active medication list. Aspirin use is indicated based on review of current medical condition/s. Pros and cons of this therapy have been discussed today. Benefits of this medication outweigh potential harm.  Patient has been encouraged to continue taking this medication.  .      Patient Active Problem List   Diagnosis    Abscess, perirectal    Anxiety    Arthritis of knee    Arthropathy, multiple sites    Gastroesophageal reflux disease without esophagitis    Mixed hyperlipidemia    Hypertension, benign    Impaired fasting glucose    Insomnia, controlled    Tobacco use    Vitamin D deficiency    Prediabetes    Class 2 severe obesity due to excess calories with serious comorbidity and body mass index (BMI) of 36.0 to 36.9 in adult    Positive colorectal cancer screening using DNA-based stool test     Advance Care Planning Advance Directive is not on file.  ACP discussion was held with the patient during this visit. Patient does not have an advance directive, information provided.            Objective   Vitals:    06/12/25 0934   BP: 136/82   BP Location: Left arm   Patient Position: Sitting   Cuff Size: Adult   Pulse: 88   Temp: 97.6 °F (36.4 °C)   TempSrc: Temporal   SpO2: 94%   Weight: 91.2 kg (201 lb)   Height: 158.8 cm (62.52\")   PainSc: 0-No pain       Estimated body mass index is 36.15 kg/m² as calculated from the following:    Height as of this encounter: 158.8 cm (62.52\").    Weight as of this encounter: 91.2 kg (201 lb).                Does the patient have " evidence of cognitive impairment? No  Lab Results   Component Value Date    CHLPL 200 2025    TRIG 180 (H) 2025    HDL 41 2025     (H) 2025    VLDL 32 2025    HGBA1C 6.20 (H) 2025                                                                                               Health  Risk Assessment    Smoking Status:  Social History     Tobacco Use   Smoking Status Every Day    Current packs/day: 0.50    Average packs/day: 0.5 packs/day for 59.4 years (29.7 ttl pk-yrs)    Types: Cigarettes    Start date: 1966   Smokeless Tobacco Never     Alcohol Consumption:  Social History     Substance and Sexual Activity   Alcohol Use No       Fall Risk Screen  STEADI Fall Risk Assessment was completed, and patient is at LOW risk for falls.Assessment completed on:2025    Depression Screening   Little interest or pleasure in doing things? Not at all   Feeling down, depressed, or hopeless? Not at all   PHQ-2 Total Score 0      Health Habits and Functional and Cognitive Screenin/12/2025     9:36 AM   Functional & Cognitive Status   Do you have difficulty preparing food and eating? No   Do you have difficulty bathing yourself, getting dressed or grooming yourself? No   Do you have difficulty using the toilet? No   Do you have difficulty moving around from place to place? No   Do you have trouble with steps or getting out of a bed or a chair? No   Current Diet Well Balanced Diet   Dental Exam Up to date   Eye Exam Up to date   Exercise (times per week) 1 times per week   Current Exercises Include Yard Work;Swimming;House Cleaning;No Regular Exercise   Do you need help using the phone?  No   Are you deaf or do you have serious difficulty hearing?  No   Do you need help to go to places out of walking distance? No   Do you need help shopping? No   Do you need help preparing meals?  No   Do you need help with housework?  No   Do you need help with laundry? No   Do you need help  taking your medications? No   Do you need help managing money? No   Do you ever drive or ride in a car without wearing a seat belt? No   Have you felt unusual stress, anger or loneliness in the last month? No   Who do you live with? Spouse   If you need help, do you have trouble finding someone available to you? No   Have you been bothered in the last four weeks by sexual problems? No   Do you have difficulty concentrating, remembering or making decisions? No           Age-appropriate Screening Schedule:  Refer to the list below for future screening recommendations based on patient's age, sex and/or medical conditions. Orders for these recommended tests are listed in the plan section. The patient has been provided with a written plan.    Health Maintenance List  Health Maintenance   Topic Date Due    TDAP/TD VACCINES (1 - Tdap) Never done    ZOSTER VACCINE (1 of 2) Never done    COVID-19 Vaccine (1 - 2024-25 season) Never done    LUNG CANCER SCREENING  01/02/2025    DXA SCAN  06/21/2025    Pneumococcal Vaccine 50+ (1 of 2 - PCV) 10/01/2025 (Originally 2/13/1974)    INFLUENZA VACCINE  07/01/2025    LIPID PANEL  06/06/2026    ANNUAL WELLNESS VISIT  06/12/2026    MAMMOGRAM  06/24/2026    COLORECTAL CANCER SCREENING  06/23/2028    HEPATITIS C SCREENING  Completed    HEMOGLOBIN A1C  Discontinued                                                                                                                                                 CMS Preventative Services Quick Reference  Risk Factors Identified During Encounter  Immunizations Discussed/Encouraged: Tdap and Shingrix  Dental Screening Recommended  Vision Screening Recommended    The above risks/problems have been discussed with the patient.  Pertinent information has been shared with the patient in the After Visit Summary.  An After Visit Summary and PPPS were made available to the patient.    Follow Up:   Next Medicare Wellness visit to be scheduled in 1  year.        Diagnoses and all orders for this visit:    1. Screening mammogram, encounter for (Primary)  -     Mammo Screening Digital Tomosynthesis Bilateral With CAD; Future    2. Post-menopausal  -     DEXA Bone Density Axial; Future    3. Carpal tunnel syndrome of left wrist  -      Wrist Hand Orthosis, Wrist Extension Control Cock-up    4. Personal history of nicotine dependence  -     CT Chest Low Dose Wo; Future    5. Hypertension, benign    6. Mixed hyperlipidemia    7. Class 2 severe obesity due to excess calories with serious comorbidity and body mass index (BMI) of 36.0 to 36.9 in adult    8. Prediabetes    9. Tobacco use    10. Vitamin D deficiency        Recommend at least annual dental and vision screening.  Recommend annual influenza vaccination  Recommend a varied diet and appropriate portion sizes.   CDC recommendations for physical activity:  At least 150 minutes a week (for example, 30 minutes a day, 5 days a week) of moderate-intensity activity such as brisk walking. Or can consider 75 minutes a week of vigorous-intensity activity such as hiking, jogging, or running.  At least 2 days a week of activities that strengthen muscles.  Plus activities to improve balance.    Health Maintenance Due   Topic Date Due    TDAP/TD VACCINES (1 - Tdap) Never done    ZOSTER VACCINE (1 of 2) Never done    COVID-19 Vaccine (1 - 2024-25 season) Never done    LUNG CANCER SCREENING  01/02/2025    DXA SCAN  06/21/2025           Result Review :           Follow Up:   Return in about 6 months (around 12/12/2025), or if symptoms worsen or fail to improve, for follow up for above problems. Longitudinal care..     An After Visit Summary and PPPS were made available to the patient.                   CLAUDY Brewer MD, FACP, Former[facility], [facility]      Electronically signed by German Brewer MD, 06/12/25, 9:54 AM CDT.    Additional E&M Note during same encounter follows:  Patient has additional, significant, and separately  "identifiable condition(s)/problem(s) that require work above and beyond the Medicare Wellness Visit       Chief Complaint  Medicare Wellness-subsequent, Constipation (Finally doing better - had issues about 3 weeks ago - still feels like she has a knot on left side ), and discuss elevated potasium    Subjective        HPI  Julia Diaz is also being seen today for carpal tunnel,   Patient here for the above problems.  See Assessment and Plan for further HPI components.        Objective   Vitals:    06/12/25 0934   BP: 136/82   BP Location: Left arm   Patient Position: Sitting   Cuff Size: Adult   Pulse: 88   Temp: 97.6 °F (36.4 °C)   TempSrc: Temporal   SpO2: 94%   Weight: 91.2 kg (201 lb)   Height: 158.8 cm (62.52\")   PainSc: 0-No pain     Physical Exam  Vitals and nursing note reviewed.   Constitutional:       Appearance: She is not ill-appearing.   Eyes:      General: No scleral icterus.     Conjunctiva/sclera: Conjunctivae normal.   Cardiovascular:      Rate and Rhythm: Normal rate and regular rhythm.   Pulmonary:      Effort: Pulmonary effort is normal. No respiratory distress.   Skin:     General: Skin is warm.      Coloration: Skin is not pale.   Neurological:      General: No focal deficit present.      Mental Status: She is alert and oriented to person, place, and time.   Psychiatric:         Mood and Affect: Mood normal.         Behavior: Behavior normal.              The following data was reviewed by: German Brewer MD on 06/12/2025:  CMP          6/6/2025    07:54   CMP   Glucose 131    BUN 11.0    Creatinine 0.77    EGFR 82.1    Sodium 139    Potassium 5.2    Chloride 103    Calcium 9.7    Total Protein 6.9    Albumin 4.3    Globulin 2.6    Total Bilirubin 0.3    Alkaline Phosphatase 111    AST (SGOT) 18    ALT (SGPT) 14    Albumin/Globulin Ratio 1.7    BUN/Creatinine Ratio 14.3      CBC w/diff          6/6/2025    07:54   CBC w/Diff   WBC 8.62    RBC 5.47    Hemoglobin 16.0    Hematocrit 49.7  "   MCV 90.9    MCH 29.3    MCHC 32.2    RDW 13.7    Platelets 351    Neutrophil Rel % 58.7    Lymphocyte Rel % 31.4    Monocyte Rel % 7.3    Eosinophil Rel % 1.7    Basophil Rel % 0.6      Lipid Panel          6/6/2025    07:54   Lipid Panel   Total Cholesterol 200    Triglycerides 180    HDL Cholesterol 41    VLDL Cholesterol 32    LDL Cholesterol  127      TSH          6/6/2025    07:54   TSH   TSH 1.480      A1C Last 3 Results          7/24/2024    11:19 12/11/2024    10:23 6/6/2025    07:54   HGBA1C Last 3 Results   Hemoglobin A1C 6.4  6.3  6.20        UA          6/6/2025    07:54   Urinalysis   Blood, UA Negative    Leukocytes, UA Negative    Nitrite, UA Negative    RBC, UA 0-2    Bacteria, UA 1+               Assessment and Plan   Diagnoses and all orders for this visit:    1. Screening mammogram, encounter for (Primary)  -     Mammo Screening Digital Tomosynthesis Bilateral With CAD; Future    2. Post-menopausal  -     DEXA Bone Density Axial; Future    3. Carpal tunnel syndrome of left wrist  -      Wrist Hand Orthosis, Wrist Extension Control Cock-up    4. Personal history of nicotine dependence  -     CT Chest Low Dose Wo; Future    5. Hypertension, benign    6. Mixed hyperlipidemia    7. Class 2 severe obesity due to excess calories with serious comorbidity and body mass index (BMI) of 36.0 to 36.9 in adult    8. Prediabetes    9. Tobacco use    10. Vitamin D deficiency      Patient has had surgery on right carpal tunnel syndrome previously in the 1990s.  Patient was told left probably was carptal tunnel but never wanted surgery previously.  Will trial cock up splint brace.  Persistant numbness.  Would need EMG to confirm for surgericla intervention.  She would like to hold at present.      Patient has a BMI > 30.  Obesity increases risks of diseases such as diabetes, coronary artery disease, hypertension, sleep apnea, hyperlipidemia, arthritis, and stroke.  Recommend focusing on portion control and  making healthy diet choices.  Avoid fast food.  Avoid calorie beverages including sweet tea, juice, soft drinks, and alcohol.  Recommend increasing your activity and starting a regular exercise program. Can consider utilizing calorie counting apps such as [employer].      CT low dose for lung cancer screening.  Recommend smoking cessation.    Constipation - miralax and docusate-senna    Patient has prediabetes based on history of A1c >5.7 but < 6.5.  Current A1c is 6.20.  Down from 6.4 previously Prediabetes places patient at risk for developing diabetes in the future.  Diabetes and prediabetes increase risk of cardiac disease and stroke.  It also increases risk of complications related to the eyes, kidneys, and circulatory system among others.  Recommend lifestyle changes including increased activity and dietary changes.  Avoid eating in excess.  Avoid sugary/calorie beverages.  Try to focus on a low-carbohydrate diet.     Mammogram      Patient has hypertension.  BP is at goal.  The patient's BP goal is < 130/80.  Recommend ambulatory BP monitoring after patient has taken medication.  Recommend varying the times of the blood pressure readings.  Patient is currently on amlodipine 10 mg.  Recommend we continue current regimen.    Continue to monitor.                     Follow Up   Return in about 6 months (around 12/12/2025), or if symptoms worsen or fail to improve, for follow up for above problems. Longitudinal care..  Patient was given instructions and counseling regarding her condition or for health maintenance advice. Please see specific information pulled into the AVS if appropriate.

## 2025-06-13 DIAGNOSIS — G56.02 CARPAL TUNNEL SYNDROME OF LEFT WRIST: ICD-10-CM

## 2025-06-26 DIAGNOSIS — Z87.891 PERSONAL HISTORY OF NICOTINE DEPENDENCE: ICD-10-CM

## 2025-07-01 DIAGNOSIS — Z78.0 POST-MENOPAUSAL: ICD-10-CM

## 2025-07-01 DIAGNOSIS — Z12.31 SCREENING MAMMOGRAM, ENCOUNTER FOR: ICD-10-CM

## (undated) DEVICE — TRAP POLYP ETRAP

## (undated) DEVICE — ENDO KIT,LOURDES HOSPITAL: Brand: MEDLINE INDUSTRIES, INC.

## (undated) DEVICE — SNARE POLYP SM W13MMXL240CM SHTH DIA2.4MM OVL FLX DISP